# Patient Record
Sex: FEMALE | Race: OTHER | Employment: PART TIME
[De-identification: names, ages, dates, MRNs, and addresses within clinical notes are randomized per-mention and may not be internally consistent; named-entity substitution may affect disease eponyms.]

---
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
## 2021-11-15 DIAGNOSIS — N92.6 MISSED PERIOD: Primary | ICD-10-CM

## 2021-11-17 ENCOUNTER — HOSPITAL ENCOUNTER (OUTPATIENT)
Age: 21
Discharge: HOME | End: 2021-11-17
Payer: COMMERCIAL

## 2021-11-17 ENCOUNTER — TELEPHONE (OUTPATIENT)
Dept: OBGYN CLINIC | Age: 21
End: 2021-11-17

## 2021-11-17 DIAGNOSIS — N92.6 MISSED PERIOD: ICD-10-CM

## 2021-11-17 LAB — HCG QUANTITATIVE: ABNORMAL IU/L

## 2021-11-17 PROCEDURE — 84702 CHORIONIC GONADOTROPIN TEST: CPT

## 2021-11-17 PROCEDURE — 36415 COLL VENOUS BLD VENIPUNCTURE: CPT

## 2021-11-17 RX ORDER — .ALPHA.-TOCOPHEROL ACETATE, DL-, ASCORBIC ACID, CHOLECALCIFEROL, CYANOCOBALAMIN, FOLIC ACID, FERROUS FUMARATE, CALCIUM PHOSPHATE, DIBASIC, ANHYDROUS, NIACINAMIDE, PYRIDOXINE HYDROCHLORIDE, RIBOFLAVIN, THIAMINE MONONITRATE, AND VITAMIN A ACETATE 15; 60; 400; 4.5; 1; 27; 50; 13.5; 1.05; 1.2; 1.05; 25 [IU]/1; MG/1; [IU]/1; UG/1; MG/1; MG/1; MG/1; MG/1; MG/1; MG/1; MG/1; [IU]/1
1 TABLET ORAL DAILY
Qty: 30 TABLET | Refills: 12 | Status: SHIPPED
Start: 2021-11-17 | End: 2021-12-27 | Stop reason: ALTCHOICE

## 2021-11-17 NOTE — TELEPHONE ENCOUNTER
Per Autumn Raines NP, pt notified of +quant, LMP 10/1/21. Sent to Zenaida Sandoval to schedule US and OB intake.

## 2021-11-17 NOTE — TELEPHONE ENCOUNTER
----- Message from LIV Villasenor NP sent at 11/17/2021  9:16 AM EST -----  + quant  Determine LMP  Order PNVs  Patient will need scheduled for NOB

## 2021-12-01 DIAGNOSIS — Z34.90 EARLY STAGE OF PREGNANCY: Primary | ICD-10-CM

## 2021-12-13 ENCOUNTER — NURSE ONLY (OUTPATIENT)
Dept: OBGYN CLINIC | Age: 21
End: 2021-12-13

## 2021-12-13 ENCOUNTER — PROCEDURE VISIT (OUTPATIENT)
Dept: OBGYN CLINIC | Age: 21
End: 2021-12-13
Payer: COMMERCIAL

## 2021-12-13 VITALS
WEIGHT: 146 LBS | HEART RATE: 77 BPM | SYSTOLIC BLOOD PRESSURE: 122 MMHG | DIASTOLIC BLOOD PRESSURE: 68 MMHG | BODY MASS INDEX: 23.57 KG/M2

## 2021-12-13 DIAGNOSIS — Z3A.11 11 WEEKS GESTATION OF PREGNANCY: ICD-10-CM

## 2021-12-13 DIAGNOSIS — O09.91 SUPERVISION OF HIGH RISK PREGNANCY IN FIRST TRIMESTER: Primary | ICD-10-CM

## 2021-12-13 DIAGNOSIS — Z34.90 EARLY STAGE OF PREGNANCY: Primary | ICD-10-CM

## 2021-12-13 DIAGNOSIS — O36.80X0 PREGNANCY WITH INCONCLUSIVE FETAL VIABILITY, SINGLE OR UNSPECIFIED FETUS: Primary | ICD-10-CM

## 2021-12-13 PROBLEM — S63.621A SPRAIN OF INTERPHALANGEAL JOINT OF RIGHT THUMB: Status: RESOLVED | Noted: 2021-12-04 | Resolved: 2021-12-13

## 2021-12-13 PROBLEM — R55 VASOVAGAL NEAR SYNCOPE: Status: RESOLVED | Noted: 2017-03-28 | Resolved: 2021-12-13

## 2021-12-13 PROBLEM — S63.621A SPRAIN OF INTERPHALANGEAL JOINT OF RIGHT THUMB: Status: ACTIVE | Noted: 2021-12-04

## 2021-12-13 LAB
CRL: NORMAL
SAC DIAMETER: NORMAL

## 2021-12-13 PROCEDURE — 76801 OB US < 14 WKS SINGLE FETUS: CPT | Performed by: OBSTETRICS & GYNECOLOGY

## 2021-12-13 PROCEDURE — 0501F PRENATAL FLOW SHEET: CPT | Performed by: NURSE PRACTITIONER

## 2021-12-13 NOTE — PROGRESS NOTES
Patient presents to office for OB intake, nurse visit only. Intake completed following ultrasound in office to confirm pregnancy dating/viability. Based on ultrasound, patient is currently 11w0d  gestation, Estimated Date of Delivery: 7/4/22. Patient presents to intake Support person. This was an planned pregnancy. Patient currently taking has a current medication list which includes the following prescription(s): prenatal gummies, pnv folic acid + iron, ondansetron, and lac-dose. . Patient's medical, surgical, obstetrical and family history reviewed. See HER for details. Patient prenatal lab work given to patient at this visit as well as OB education material. New OB consent forms signed. Patient accepted first trimester screening. Cystic Fibrosis screening ordered. Referral placed to [facility] for first trimester screen. Patient with small subchorionic hemorrhage; instructed on pelvic rest and no heavy lifting. Patient scheduled for follow up OB appointment with Sourav Pablo NP on 1/5/2022. Patient instructed to complete lab work prior to follow up OB appointment.

## 2021-12-27 ENCOUNTER — ROUTINE PRENATAL (OUTPATIENT)
Dept: PERINATAL CARE | Age: 21
End: 2021-12-27
Payer: COMMERCIAL

## 2021-12-27 VITALS
DIASTOLIC BLOOD PRESSURE: 76 MMHG | HEART RATE: 94 BPM | BODY MASS INDEX: 23.46 KG/M2 | HEIGHT: 66 IN | WEIGHT: 146 LBS | SYSTOLIC BLOOD PRESSURE: 122 MMHG

## 2021-12-27 DIAGNOSIS — Z36.9 FIRST TRIMESTER SCREENING: Primary | ICD-10-CM

## 2021-12-27 LAB
CRL: NORMAL
SAC DIAMETER: NORMAL

## 2021-12-27 PROCEDURE — 76813 OB US NUCHAL MEAS 1 GEST: CPT | Performed by: OBSTETRICS & GYNECOLOGY

## 2021-12-27 PROCEDURE — 76801 OB US < 14 WKS SINGLE FETUS: CPT | Performed by: OBSTETRICS & GYNECOLOGY

## 2021-12-27 PROCEDURE — 99999 PR OFFICE/OUTPT VISIT,PROCEDURE ONLY: CPT | Performed by: OBSTETRICS & GYNECOLOGY

## 2022-01-05 ENCOUNTER — ROUTINE PRENATAL (OUTPATIENT)
Dept: OBGYN CLINIC | Age: 22
End: 2022-01-05

## 2022-01-05 ENCOUNTER — HOSPITAL ENCOUNTER (OUTPATIENT)
Age: 22
Discharge: HOME | End: 2022-01-05
Payer: COMMERCIAL

## 2022-01-05 VITALS
BODY MASS INDEX: 23.57 KG/M2 | WEIGHT: 146 LBS | DIASTOLIC BLOOD PRESSURE: 64 MMHG | HEART RATE: 82 BPM | SYSTOLIC BLOOD PRESSURE: 112 MMHG

## 2022-01-05 DIAGNOSIS — Z3A.11 11 WEEKS GESTATION OF PREGNANCY: ICD-10-CM

## 2022-01-05 DIAGNOSIS — Z3A.14 14 WEEKS GESTATION OF PREGNANCY: ICD-10-CM

## 2022-01-05 DIAGNOSIS — Z34.00 PRIMIGRAVIDA, ANTEPARTUM: Primary | ICD-10-CM

## 2022-01-05 DIAGNOSIS — Z34.90 EARLY STAGE OF PREGNANCY: ICD-10-CM

## 2022-01-05 LAB
ABO/RH: NORMAL
ABSOLUTE EOS #: 0 K/UL (ref 0–0.4)
ABSOLUTE IMMATURE GRANULOCYTE: ABNORMAL K/UL (ref 0–0.3)
ABSOLUTE LYMPH #: 1.7 K/UL (ref 1–4.8)
ABSOLUTE MONO #: 0.4 K/UL (ref 0.1–1.3)
ANTIBODY SCREEN: NEGATIVE
BASOPHILS # BLD: 0 % (ref 0–2)
BASOPHILS ABSOLUTE: 0 K/UL (ref 0–0.2)
BILIRUBIN URINE: NEGATIVE
COLOR: YELLOW
COMMENT UA: NORMAL
DIFFERENTIAL TYPE: ABNORMAL
EOSINOPHILS RELATIVE PERCENT: 0 % (ref 0–4)
GLUCOSE URINE: NEGATIVE
HCT VFR BLD CALC: 38 % (ref 36–46)
HEMOGLOBIN: 13 G/DL (ref 12–16)
IMMATURE GRANULOCYTES: ABNORMAL %
KETONES, URINE: NEGATIVE
LEUKOCYTE ESTERASE, URINE: NEGATIVE
LYMPHOCYTES # BLD: 21 % (ref 25–45)
MCH RBC QN AUTO: 30.7 PG (ref 26–34)
MCHC RBC AUTO-ENTMCNC: 34.1 G/DL (ref 31–37)
MCV RBC AUTO: 90.1 FL (ref 80–100)
MONOCYTES # BLD: 5 % (ref 2–8)
NITRITE, URINE: NEGATIVE
NRBC AUTOMATED: ABNORMAL PER 100 WBC
PDW BLD-RTO: 12.6 % (ref 11.5–14.9)
PH UA: 6.5 (ref 5–8)
PLATELET # BLD: 261 K/UL (ref 150–450)
PLATELET ESTIMATE: ABNORMAL
PMV BLD AUTO: 7.9 FL (ref 6–12)
PROTEIN UA: NEGATIVE
RBC # BLD: 4.22 M/UL (ref 4–5.2)
RBC # BLD: ABNORMAL 10*6/UL
SEG NEUTROPHILS: 74 % (ref 34–64)
SEGMENTED NEUTROPHILS ABSOLUTE COUNT: 5.9 K/UL (ref 1.3–9.1)
SPECIFIC GRAVITY UA: 1.02 (ref 1–1.03)
TSH SERPL DL<=0.05 MIU/L-ACNC: 1.19 MIU/L (ref 0.3–5)
TURBIDITY: CLEAR
URINE HGB: NEGATIVE
UROBILINOGEN, URINE: NORMAL
WBC # BLD: 8 K/UL (ref 4.5–13.5)
WBC # BLD: ABNORMAL 10*3/UL

## 2022-01-05 PROCEDURE — 86778 TOXOPLASMA ANTIBODY IGM: CPT

## 2022-01-05 PROCEDURE — 86780 TREPONEMA PALLIDUM: CPT

## 2022-01-05 PROCEDURE — 0502F SUBSEQUENT PRENATAL CARE: CPT | Performed by: NURSE PRACTITIONER

## 2022-01-05 PROCEDURE — 86777 TOXOPLASMA ANTIBODY: CPT

## 2022-01-05 PROCEDURE — 85025 COMPLETE CBC W/AUTO DIFF WBC: CPT

## 2022-01-05 PROCEDURE — 86762 RUBELLA ANTIBODY: CPT

## 2022-01-05 PROCEDURE — 81003 URINALYSIS AUTO W/O SCOPE: CPT

## 2022-01-05 PROCEDURE — 84443 ASSAY THYROID STIM HORMONE: CPT

## 2022-01-05 PROCEDURE — 86850 RBC ANTIBODY SCREEN: CPT

## 2022-01-05 PROCEDURE — 86901 BLOOD TYPING SEROLOGIC RH(D): CPT

## 2022-01-05 PROCEDURE — 81220 CFTR GENE COM VARIANTS: CPT

## 2022-01-05 PROCEDURE — 86900 BLOOD TYPING SEROLOGIC ABO: CPT

## 2022-01-05 PROCEDURE — 87389 HIV-1 AG W/HIV-1&-2 AB AG IA: CPT

## 2022-01-05 PROCEDURE — 36415 COLL VENOUS BLD VENIPUNCTURE: CPT

## 2022-01-05 PROCEDURE — 87340 HEPATITIS B SURFACE AG IA: CPT

## 2022-01-05 NOTE — PROGRESS NOTES
Katia Seen  2022    YOB: 2000   No LMP recorded (approximate). Patient is pregnant. 14w2d        Primary Care Physician: No primary care provider on file. CC: Initial Prenatal Visit    Subjective:     Katia Seen is a 24 y.o. female     is being seen today for her first obstetrical visit. This is not a planned pregnancy. She is at 14w2d  Her obstetrical history is significant for primigravida      Relationship with FOB: significant other, living together. Patient does not intend to breast feed. Pregnancy history fully reviewed. Mother's ethnicity: mixed  Father's ethnicity: mixed       Objective:   Blood pressure 112/64, pulse 82, weight 146 lb (66.2 kg), not currently breastfeeding. OB History    Para Term  AB Living   1 0 0 0 0 0   SAB IAB Ectopic Molar Multiple Live Births   0 0 0 0 0 0      # Outcome Date GA Lbr Daniel/2nd Weight Sex Delivery Anes PTL Lv   1 Current                Past Medical History:   Diagnosis Date    Anemia     Atopic dermatitis 10/23/2011    Depression     anxiety    Multiple fractures 10/23/2011    Otitis media, left 1/10/2012    Sports physical 10/17/2016    Sprain of interphalangeal joint of right thumb 2021    Vasovagal near syncope 3/28/2017     History reviewed. No pertinent surgical history.    Social History     Socioeconomic History    Marital status: Single     Spouse name: Not on file    Number of children: Not on file    Years of education: Not on file    Highest education level: Not on file   Occupational History    Not on file   Tobacco Use    Smoking status: Passive Smoke Exposure - Never Smoker    Smokeless tobacco: Never Used    Tobacco comment: dad smokes outside   Vaping Use    Vaping Use: Never used   Substance and Sexual Activity    Alcohol use: Not Currently     Alcohol/week: 0.0 standard drinks    Drug use: Not Currently    Sexual activity: Never   Other Topics Concern    Not on file   Social History Narrative    Not on file     Social Determinants of Health     Financial Resource Strain:     Difficulty of Paying Living Expenses: Not on file   Food Insecurity:     Worried About Running Out of Food in the Last Year: Not on file    Heron of Food in the Last Year: Not on file   Transportation Needs:     Lack of Transportation (Medical): Not on file    Lack of Transportation (Non-Medical):  Not on file   Physical Activity:     Days of Exercise per Week: Not on file    Minutes of Exercise per Session: Not on file   Stress:     Feeling of Stress : Not on file   Social Connections:     Frequency of Communication with Friends and Family: Not on file    Frequency of Social Gatherings with Friends and Family: Not on file    Attends Jain Services: Not on file    Active Member of [address] [place] [address] [employer] or Organizations: Not on file    Attends Club or Organization Meetings: Not on file    Marital Status: Not on file   Intimate Partner Violence:     Fear of Current or Ex-Partner: Not on file    Emotionally Abused: Not on file    Physically Abused: Not on file    Sexually Abused: Not on file   Housing Stability:     Unable to Pay for Housing in the Last Year: Not on file    Number of [place] in the Last Year: Not on file    Unstable Housing in the Last Year: Not on file     Family History   Problem Relation Age of Onset    Rheum Arthritis Mother         psoriatic arthritis    Hypertension Mother     Diabetes Mother     Heart Disease Mother         by pass    Irritable Bowel Syndrome Mother     Hypertension Father     Asthma Sister     Arthritis Maternal Grandmother     Asthma Maternal Grandmother     Heart Disease Maternal Grandfather     Diabetes Maternal Grandfather     Stroke Paternal Grandmother     Cancer Paternal Grandfather         prostate    Heart Disease Paternal Grandfather         arrythmia    Diabetes Paternal Grandfather     Bipolar Disorder Maternal Aunt        MEDICATIONS:  Current Outpatient Medications   Medication Sig Dispense Refill    Prenatal MV & Min w/FA-DHA (PRENATAL GUMMIES) 0.18-25 MG CHEW Take 1 tablet by mouth Daily 30 tablet 11     No current facility-administered medications for this visit. ALLERGIES:  Allergies as of 2022    (No Known Allergies)           Physical Exam Completed-See Epic Navigator    Chaperone for Intimate Exam   Chaperone was offered and accepted as part of the rooming process.  Chaperone: Sugar BAHENA               Assessment:      Pregnancy at 15 and 2/7 weeks    Diagnosis Orders   1. 14 weeks gestation of pregnancy             There are no problems to display for this patient. Plan:      Initial labs drawn. Pap smear collected  Vaginal cultures collected   Prenatal vitamins. Problem list reviewed and updated. NT Screen/Quad Testing and MSAFP Single Marker: requested  Role of ultrasound in pregnancy discussed; fetal survey: requests  Amniocentesis discussed: Not indicated  NIPT Testing not indicated  Cultures & Wet Prep Collected  Follow-up in 4 weeks  Repeat Annual every 1 year  Cervical Cytology Evaluation begins at 24years old. If Negative Cytology, Follow-up screening per current guidelines. MFM appointment scheduled      COUNSELING COMPLETED:  INITIAL OBSTETRICAL VISIT EVALUATION:  The patient was seen full history and physical was completed/reviewed. Cytology was collected for patients over 24years of age. Cultures were collected. The patient was counseled on office policies and she was counseled on termination of pregnancy in the state of [place]. The patient was counseled on Toxoplasmosis, HIV, Tobacco Abuse, Group Beta Strep Infections, Cystic Fibrosis,  Labor precautions and Sickle Cell disease. The patient was counseled on the risks of tobacco abuse.  Both maternal and fetal. She was instructed to stop smoking if currently using tobacco. Morbidity, mortality, and cessation programs were reviewed. The risks include but are not limited to increased risks of  labor,  delivery, premature rupture of membranes, intrauterine growth restriction, intrauterine fetal demise and abruptio placenta. Secondary smoke risks were also reviewed. Increases in cancer, respiratory problems, and sudden infant death syndrome were reviewed as well. The patient was informed of a 2-4% risk of congenital anomalies in the general population. She was also informed that karyotyping is the only way to evaluate the fetus for genetic problems and genetic lethal anomalies. Chorionic villous sampling, amniocentesis and NIPT testing were also discussed with morbidity rates in detail. She requested the procedure. Route of delivery and counseling on vaginal, operative vaginal, and  sections were completed with the risks of each to both the patient as well as her baby. The possibility of a blood transfusion was discussed as well. The patient was not opposed to receiving a transfusion if needed. Nuchal translucency/Quad Evaluation and MSAFP single marker testing was reviewed in detail with attention to timing of testing and their windows. For patients beyond the gestational age for Nuchal translucency evaluation Quad testing was recommended. Timing for the Quad test was reviewed. Benefits of the above testing was reviewed. A second trimester amniocentesis was also made available to the patient. Risks, Benefits and non-invasive alternative testing was reviewed. The literature regarding a questionable link to pitocin augmentation and induction of labor, the assistance of labor contractions and the initiation of contractions to help delivery, have been reviewed with the patient regarding the increased potential of having a  with Attention Deficit Hyperactivity Disorder and or Autism.  These two disorders and the ramifications of their impact on a child and the family caring for that child has been reviewed with the patient in detail. She was given the risks, benefits and alternatives of the use of this medication. She has agreed to its use in the delivery of her unborn child if needed at the time of delivery, Yes. The patient was questioned in detail regarding any genetic misnomer history, chromosomal abnormalities, or learning disabilities in  herself, the father of the baby or their families. SHE DENIED ANY HISTORY AS STATED ABOVE: Yes    Upon completion of the visit all questions were answered and the patients follow-up and testing schedule were reviewed. Prenatal vitamins were given. T-dap Vaccine recommendations reviewed with the patient. Patient notified of timing of vaccination 27-36 weeks gestation. Patient aware Vaccine is NOT Live. Yes. The patient, Sven Fowler is a 24 y.o. female, was seen with a total time spent of 30 minutes for the visit on this date of service by the E/M provider. The time component had both face to face and non face to face time spent in determining the total time component. Counseling and education regarding her diagnosis listed below and her options regarding those diagnoses were also included in determining her time component. Diagnosis Orders   1. 14 weeks gestation of pregnancy          The patient had her preventative health maintenance recommendations and follow-up reviewed with her at the completion of her visit.

## 2022-01-06 ENCOUNTER — HOSPITAL ENCOUNTER (OUTPATIENT)
Age: 22
Discharge: HOME | End: 2022-01-06
Payer: COMMERCIAL

## 2022-01-06 DIAGNOSIS — Z3A.11 11 WEEKS GESTATION OF PREGNANCY: ICD-10-CM

## 2022-01-06 DIAGNOSIS — Z34.90 EARLY STAGE OF PREGNANCY: ICD-10-CM

## 2022-01-06 LAB
GLUCOSE ADMINISTRATION: NORMAL
GLUCOSE TOLERANCE SCREEN 50G: 95 MG/DL (ref 70–135)
HEPATITIS B SURFACE ANTIGEN: NONREACTIVE
HIV AG/AB: NONREACTIVE
RUBV IGG SER QL: 299.2 IU/ML
T. PALLIDUM, IGG: NONREACTIVE
TOXOPLASM IGM: 0.44 INDEX
TOXOPLASMA BLOOD FOR RATIO: <0.5 IU/ML

## 2022-01-06 PROCEDURE — 82950 GLUCOSE TEST: CPT

## 2022-01-06 PROCEDURE — 36415 COLL VENOUS BLD VENIPUNCTURE: CPT

## 2022-01-10 DIAGNOSIS — Z3A.14 14 WEEKS GESTATION OF PREGNANCY: ICD-10-CM

## 2022-01-12 LAB — CYSTIC FIBROSIS: NORMAL

## 2022-01-13 ENCOUNTER — TELEPHONE (OUTPATIENT)
Dept: OBGYN CLINIC | Age: 22
End: 2022-01-13

## 2022-01-13 DIAGNOSIS — Z3A.14 14 WEEKS GESTATION OF PREGNANCY: ICD-10-CM

## 2022-01-13 RX ORDER — METRONIDAZOLE 500 MG/1
500 TABLET ORAL 2 TIMES DAILY
Qty: 14 TABLET | Refills: 0 | Status: SHIPPED | OUTPATIENT
Start: 2022-01-13 | End: 2022-01-20

## 2022-01-13 NOTE — TELEPHONE ENCOUNTER
tried calling pt regarding +BV on culture and pt will need flagyl 500mg. No answer and mailbox if full. I will try later.

## 2022-02-09 ENCOUNTER — ROUTINE PRENATAL (OUTPATIENT)
Dept: OBGYN CLINIC | Age: 22
End: 2022-02-09

## 2022-02-09 VITALS
HEART RATE: 84 BPM | SYSTOLIC BLOOD PRESSURE: 122 MMHG | BODY MASS INDEX: 25.99 KG/M2 | WEIGHT: 161 LBS | DIASTOLIC BLOOD PRESSURE: 82 MMHG

## 2022-02-09 DIAGNOSIS — Z3A.19 19 WEEKS GESTATION OF PREGNANCY: Primary | ICD-10-CM

## 2022-02-09 PROCEDURE — 0502F SUBSEQUENT PRENATAL CARE: CPT | Performed by: NURSE PRACTITIONER

## 2022-02-09 NOTE — PROGRESS NOTES
Bette Doe is a 24 y.o. female 25w3d        OB History    Para Term  AB Living   1             SAB IAB Ectopic Molar Multiple Live Births                    # Outcome Date GA Lbr Daniel/2nd Weight Sex Delivery Anes PTL Lv   1 Current                Vitals  BP: 122/82  Weight: 161 lb (73 kg)  Pulse: 84  Patient Position: Sitting  Albumin: Negative  Glucose: Negative    The patient was seen and evaluated. There was positive fetal movements. No contractions or leakage of fluid. Signs and symptoms of  labor as well as labor were reviewed. The Nuchal Translucency testing was reviewed and found to be normal A single marker MSAFP was reviewed and found to be ordered today. The patients anatomy ultrasound has been completed and reviewed with patient. TOP ST OH Reviewed. A 28 week lab panel was ordered. This includes a (HH, 1 hr GTT, U/A C&S). The patient is to complete this in the next two to four weeks. The S/S of Pre-Eclampsia were reviewed with the patient in detail. She is to report any of these if they occur. She currently denies any of these. The patient is RH positive Rhogam Ordered no    The patient was instructed on fetal kick counts and was given a kick sheet to complete every 8 hours. This is to begin at 28 weeks gestation. She was instructed that the baby should move at a minimum of ten times within one hour after a meal. The patient was instructed to lay down on her left side twenty minutes after eating and count movements for up to one hour with a target value of ten movements. She was instructed to notify the office if she did not make that target after two attempts or if after any attempt there was less than four movements. No Patient Care Coordination Note on file. Assessment:  1. Bette Doe is a 24 y.o. female  2.    3. 19w2d    Patient Active Problem List    Diagnosis Date Noted   Scarlet Plum, antepartum 2022     Priority: High Diagnosis Orders   1. 19 weeks gestation of pregnancy  Alpha Fetoprotein, Maternal         Plan:  The patient will return to the office for her next visit in 4 weeks. See antepartum flow sheet. Lab slip given for JONO.  KARON 2/14/2022        Patient was seen with total face to face time of 20 minutes. More than 50% of this visit was on counseling and education regarding her    Diagnosis Orders   1. 19 weeks gestation of pregnancy  Alpha Fetoprotein, Maternal    and her options. She was also counseled on her preventative health maintenance recommendations and follow-up.

## 2022-02-14 ENCOUNTER — ROUTINE PRENATAL (OUTPATIENT)
Dept: PERINATAL CARE | Age: 22
End: 2022-02-14
Payer: COMMERCIAL

## 2022-02-14 VITALS
BODY MASS INDEX: 26.68 KG/M2 | WEIGHT: 166 LBS | HEART RATE: 94 BPM | TEMPERATURE: 96.8 F | RESPIRATION RATE: 16 BRPM | DIASTOLIC BLOOD PRESSURE: 64 MMHG | SYSTOLIC BLOOD PRESSURE: 119 MMHG | HEIGHT: 66 IN

## 2022-02-14 DIAGNOSIS — Z36.86 ENCOUNTER FOR SCREENING FOR RISK OF PRE-TERM LABOR: ICD-10-CM

## 2022-02-14 DIAGNOSIS — O43.112 CIRCUMVALLATE PLACENTA IN SECOND TRIMESTER: Primary | ICD-10-CM

## 2022-02-14 DIAGNOSIS — Z3A.20 20 WEEKS GESTATION OF PREGNANCY: ICD-10-CM

## 2022-02-14 DIAGNOSIS — O44.40 LOW IMPLANTATION OF PLACENTA WITHOUT HEMORRHAGE: ICD-10-CM

## 2022-02-14 PROBLEM — O43.119 ANTEPARTUM PLACENTA CIRCUMVALLATA: Status: ACTIVE | Noted: 2022-02-14

## 2022-02-14 LAB
ABDOMINAL CIRCUMFERENCE: NORMAL
ABDOMINAL CIRCUMFERENCE: NORMAL
BIPARIETAL DIAMETER: NORMAL
BIPARIETAL DIAMETER: NORMAL
ESTIMATED FETAL WEIGHT: NORMAL
ESTIMATED FETAL WEIGHT: NORMAL
FEMORAL DIAMETER: NORMAL
FEMORAL DIAMETER: NORMAL
HC/AC: NORMAL
HC/AC: NORMAL
HEAD CIRCUMFERENCE: NORMAL
HEAD CIRCUMFERENCE: NORMAL

## 2022-02-14 PROCEDURE — 76817 TRANSVAGINAL US OBSTETRIC: CPT | Performed by: OBSTETRICS & GYNECOLOGY

## 2022-02-14 PROCEDURE — 76811 OB US DETAILED SNGL FETUS: CPT | Performed by: OBSTETRICS & GYNECOLOGY

## 2022-02-14 PROCEDURE — 99999 PR OFFICE/OUTPT VISIT,PROCEDURE ONLY: CPT | Performed by: OBSTETRICS & GYNECOLOGY

## 2022-02-14 NOTE — PROGRESS NOTES
Advise repeat 1-hour glucola between 24-28 weeks' gestation to evaluate for gestational diabetes. Advise MSAFP (maternal serum alpha-feto protein level) only lab blood draw (ordered by referring OB provider previously). Patient declines Maternal-Fetal Medicine consultation at this time regarding the medical/obstetrical complications of pregnancy. Maternal-Fetal Medicine (MFM) attending physician will defer all management for the medical/obstetrical complications of pregnancy to the primary attending obstetrical physician, as a result. Therefore, only an ultrasound evaluation was completed today in the MFM office.

## 2022-02-18 ENCOUNTER — HOSPITAL ENCOUNTER (OUTPATIENT)
Age: 22
Discharge: HOME | End: 2022-02-18
Payer: COMMERCIAL

## 2022-02-18 PROCEDURE — 86336 INHIBIN A: CPT

## 2022-02-18 PROCEDURE — 84702 CHORIONIC GONADOTROPIN TEST: CPT

## 2022-02-18 PROCEDURE — 82677 ASSAY OF ESTRIOL: CPT

## 2022-02-18 PROCEDURE — 36415 COLL VENOUS BLD VENIPUNCTURE: CPT

## 2022-02-18 PROCEDURE — 82105 ALPHA-FETOPROTEIN SERUM: CPT

## 2022-02-21 LAB
AFP INTERPRETATION: NORMAL
AFP MOM: 0.84
AFP QUAD INTERPRETATION: NORMAL
AFP SPECIMEN: NORMAL
AFP: 52 NG/ML
DATE OF BIRTH: NORMAL
DATING METHOD: NORMAL
DETERMINED BY: NORMAL
DIABETIC: NO
DIMERIC INHIBIN A: 132 PG/ML
DONOR EGG?: NO
DUE DATE: NORMAL
ESTIMATED DUE DATE: NORMAL
FAMILY HISTORY NTD: NO
GESTATIONAL AGE: NORMAL
HISTORY OF ANEUPLOIDY?: NO
IN VITRO FERTILIZATION: NO
INHIBIN A MOM: 0.81
INSULIN REQ DIABETES: NO
LAST MENSTRUAL PERIOD: NORMAL
MATERNAL AGE AT EDD: 22 YR
MATERNAL WEIGHT: 161
MOM FOR HCG, 2ND TRIMESTER: 0.59
MONOCHORIONIC TWINS: NO
NUMBER OF FETUSES: NORMAL
PATIENT WEIGHT UNITS: NORMAL
PATIENT WEIGHT: NORMAL
PATIENT'S HCG, TRI 2: NORMAL IU/L
PREV TRISOMY PREG: NO
RACE (MATERNAL): NORMAL
RACE: NORMAL
REPEAT SPECIMEN?: NO
SMOKING: NO
SMOKING: NO
UE3 MOM: 0.89
UE3 VALUE: 2.19 NG/ML
VALPROIC/CARBAMAZEP: NO
ZZ NTE CLEAN UP: HISTORY: NO

## 2022-03-15 ENCOUNTER — ROUTINE PRENATAL (OUTPATIENT)
Dept: OBGYN CLINIC | Age: 22
End: 2022-03-15

## 2022-03-15 VITALS
WEIGHT: 180 LBS | DIASTOLIC BLOOD PRESSURE: 62 MMHG | BODY MASS INDEX: 29.05 KG/M2 | HEART RATE: 97 BPM | SYSTOLIC BLOOD PRESSURE: 120 MMHG

## 2022-03-15 DIAGNOSIS — O44.40 LOW-LYING PLACENTA: ICD-10-CM

## 2022-03-15 DIAGNOSIS — Z3A.24 24 WEEKS GESTATION OF PREGNANCY: Primary | ICD-10-CM

## 2022-03-15 PROCEDURE — 0502F SUBSEQUENT PRENATAL CARE: CPT | Performed by: OBSTETRICS & GYNECOLOGY

## 2022-03-15 NOTE — PROGRESS NOTES
Suzanne Sensor is a 24 y.o. female 25w3d        OB History    Para Term  AB Living   1             SAB IAB Ectopic Molar Multiple Live Births                    # Outcome Date GA Lbr Daniel/2nd Weight Sex Delivery Anes PTL Lv   1 Current                Vitals  BP: 120/62  Weight: 180 lb (81.6 kg)  Pulse: 97  Patient Position: Sitting  Albumin: Negative  Glucose: Negative    The patient was seen and evaluated. There was positive fetal movements. No contractions or leakage of fluid. Signs and symptoms of  labor as well as labor were reviewed. The Nuchal Translucency testing was reviewed and found to be normal A single marker MSAFP was reviewed and found to be normal. The patients anatomy ultrasound has been completed and reviewed with patient. TOP ST OH Reviewed. A 28 week lab panel was ordered. This includes a (HH, 1 hr GTT, U/A C&S). The patient is to complete this in the next two to four weeks. The S/S of Pre-Eclampsia were reviewed with the patient in detail. She is to report any of these if they occur. She currently denies any of these. The patient is RH positive Rhogam Ordered no    The patient was instructed on fetal kick counts and was given a kick sheet to complete every 8 hours. This is to begin at 28 weeks gestation. She was instructed that the baby should move at a minimum of ten times within one hour after a meal. The patient was instructed to lay down on her left side twenty minutes after eating and count movements for up to one hour with a target value of ten movements. She was instructed to notify the office if she did not make that target after two attempts or if after any attempt there was less than four movements. No Patient Care Coordination Note on file. Assessment:  1. Suzanne Sensor is a 24 y.o. female  2.    3. 24w1d    Patient Active Problem List    Diagnosis Date Noted    Low-lying placenta 2022     Priority: High     Pelvic rest/ no heavy lifting       Primigravida, antepartum 2022     Priority: High    Antepartum placenta circumvallata 2022        Diagnosis Orders   1. 24 weeks gestation of pregnancy     2. Low-lying placenta           Plan:  The patient will return to the office for her next visit in 4 weeks. See antepartum flow sheet. [id number] [address] [facility]  [facility] [address]  [phone]    Yolanda Milligan  3/15/2022  No LMP recorded (approximate). Patient is pregnant. INITIAL OBSTETRICAL VISIT EVALUATION:  The patient was seen full history and physical was completed/reviewed. Cytology was collected for patients over 24years of age. Cultures were collected. The patient was counseled on office policies and she was counseled on termination of pregnancy in the state of [place]. The patient was counseled on Toxoplasmosis, HIV, Tobacco Abuse, Group Beta Strep Infections, Cystic Fibrosis,  Labor precautions and Sickle Cell disease. The patient was counseled on the risks of tobacco abuse. Both maternal and fetal. She was instructed to stop smoking if currently using tobacco. Morbidity, mortality, and cessation programs were reviewed. The risks include but are not limited to increased risks of  labor,  delivery, premature rupture of membranes, intrauterine growth restriction, intrauterine fetal demise and abruptio placenta. Secondary smoke risks were also reviewed. Increases in cancer, respiratory problems, and sudden infant death syndrome were reviewed as well. The patient was informed of a 2-4% risk of congenital anomalies in the general population. She was also informed that karyotyping is the only way to evaluate the fetus for genetic problems and genetic lethal anomalies. Chorionic villous sampling, amniocentesis and Maternal Genetic Blood Sampling-(NIPT Testing) were also discussed with morbidity rates in detail.  She declined any of the options. Route of delivery and counseling on vaginal, operative vaginal, and  sections were completed with the risks of each to both the patient as well as her baby. The possibility of a blood transfusion was discussed as well. The patient was not opposed to receiving a transfusion if needed. Nuchal translucency and MSAFP single marker testing was reviewed in detail with attention to timing of testing and their windows. For patients beyond the gestational age for Nuchal translucency evaluation, ([id number]) Quad testing was recommended. Timing for the Quad test was reviewed. Benefits of the above testing was reviewed. A second trimester amniocentesis was also made available to the patient. Risks, Benefits and non-invasive alternative testing was reviewed. The patients diagnosed with Advanced maternal age, AMA (age of 27 yo or beyond at delivery), had NIPT recommended. This was discussed in lay terms with Risks and Benefits reviewed. The literature regarding a questionable link to pitocin augmentation and induction of labor, the assistance of labor contractions and the initiation of contractions to help delivery, have been reviewed with the patient regarding the increased potential of having a  with Attention Deficit Hyperactivity Disorder and or Autism. These two disorders and the ramifications of their impact on a child and the family caring for that child has been reviewed with the patient in detail. She was given the risks, benefits and alternatives of the use of this medication. She has agreed to its use in the delivery of her unborn child if needed at the time of delivery, Yes. The patient was questioned in detail regarding any genetic misnomer history, chromosomal abnormalities, or learning disabilities in  herself, the father of the baby or their families.  SHE DENIED ANY HISTORY AS STATED ABOVE: Yes    Upon completion of the visit all questions were answered and the patients follow-up and testing schedule were reviewed. Prenatal vitamins were given. Patient was seen with total face to face time of 20 minutes. More than 50% of this visit was on counseling and education regarding her    Diagnosis Orders   1. 24 weeks gestation of pregnancy     2. Low-lying placenta      and her options. She was also counseled on her preventative health maintenance recommendations and follow-up.

## 2022-03-28 ENCOUNTER — ROUTINE PRENATAL (OUTPATIENT)
Dept: PERINATAL CARE | Age: 22
End: 2022-03-28
Payer: COMMERCIAL

## 2022-03-28 VITALS
WEIGHT: 181 LBS | HEART RATE: 96 BPM | SYSTOLIC BLOOD PRESSURE: 119 MMHG | HEIGHT: 66 IN | DIASTOLIC BLOOD PRESSURE: 75 MMHG | BODY MASS INDEX: 29.09 KG/M2 | RESPIRATION RATE: 16 BRPM | TEMPERATURE: 96.9 F

## 2022-03-28 DIAGNOSIS — Z03.72 SUSPECTED PLACENTAL PROBLEM NOT FOUND: ICD-10-CM

## 2022-03-28 DIAGNOSIS — Z36.4 ANTENATAL SCREENING FOR FETAL GROWTH RETARDATION USING ULTRASONICS: ICD-10-CM

## 2022-03-28 DIAGNOSIS — Z3A.26 26 WEEKS GESTATION OF PREGNANCY: ICD-10-CM

## 2022-03-28 DIAGNOSIS — O44.40 LOW IMPLANTATION OF PLACENTA WITHOUT HEMORRHAGE: ICD-10-CM

## 2022-03-28 DIAGNOSIS — O43.112 CIRCUMVALLATE PLACENTA IN SECOND TRIMESTER: Primary | ICD-10-CM

## 2022-03-28 PROCEDURE — 76817 TRANSVAGINAL US OBSTETRIC: CPT | Performed by: OBSTETRICS & GYNECOLOGY

## 2022-03-28 PROCEDURE — 99999 PR OFFICE/OUTPT VISIT,PROCEDURE ONLY: CPT | Performed by: OBSTETRICS & GYNECOLOGY

## 2022-03-28 PROCEDURE — 76816 OB US FOLLOW-UP PER FETUS: CPT | Performed by: OBSTETRICS & GYNECOLOGY

## 2022-03-28 RX ORDER — AMOXICILLIN 500 MG/1
CAPSULE ORAL
COMMUNITY
Start: 2022-03-20 | End: 2022-04-27

## 2022-04-13 ENCOUNTER — ROUTINE PRENATAL (OUTPATIENT)
Dept: OBGYN CLINIC | Age: 22
End: 2022-04-13

## 2022-04-13 VITALS
SYSTOLIC BLOOD PRESSURE: 114 MMHG | DIASTOLIC BLOOD PRESSURE: 62 MMHG | BODY MASS INDEX: 31.15 KG/M2 | HEART RATE: 97 BPM | WEIGHT: 193 LBS

## 2022-04-13 DIAGNOSIS — O44.40 LOW-LYING PLACENTA: ICD-10-CM

## 2022-04-13 DIAGNOSIS — O43.119 ANTEPARTUM PLACENTA CIRCUMVALLATA: ICD-10-CM

## 2022-04-13 DIAGNOSIS — Z3A.28 28 WEEKS GESTATION OF PREGNANCY: ICD-10-CM

## 2022-04-13 DIAGNOSIS — Z34.00 PRIMIGRAVIDA, ANTEPARTUM: Primary | ICD-10-CM

## 2022-04-13 PROCEDURE — 0502F SUBSEQUENT PRENATAL CARE: CPT | Performed by: NURSE PRACTITIONER

## 2022-04-13 NOTE — PROGRESS NOTES
Nisreen Tejeda is a 24 y.o. female 34w4d        OB History    Para Term  AB Living   1             SAB IAB Ectopic Molar Multiple Live Births                    # Outcome Date GA Lbr Daniel/2nd Weight Sex Delivery Anes PTL Lv   1 Current                Vitals         The patient was seen and evaluated. There was positive fetal movements. No contractions or leakage of fluid. Signs and symptoms of  labor as well as labor were reviewed. The S/S of Pre-Eclampsia were reviewed with the patient in detail. She is to report any of these if they occur. She currently denies any of these. The patient had her 28 week labs ordered. No visits with results within 5 Week(s) from this visit.    Latest known visit with results is:   Hospital Outpatient Visit on 2022   Component Date Value Ref Range Status    Date of Birth 2022 6,262,000   Final    Maternal Weight 2022 161   Final    Patient Weight Units 2022 LBS   Final    Est Due Date 2022 [id number]   Final    Dating Method 2022 38,421,017   Final    Last Menstrual Period 2022 17,541,643   Final    Monochorionic Twins 2022 NO   Final    Race (Maternal) 2022    Final    Diabetic 2022 NO   Final    Smoking 2022 NO   Final    Valproic/Carbamazep 2022 NO   Final    Prev Trisomy Preg 2022 NO   Final    Fam HX NTD 2022 NO   Final    In Vitro Fertilization 2022 NO   Final    Donor Egg? 2022 NO   Final    Repeat Specimen? 2022 NO   Final    AFP (Alpha Fetoprotein) 2022 52  ng/mL Final    AFP Mom 2022 0.84   Final    uE3 Value 2022 2.19  ng/mL Final    uE3 MoM 2022 0.89   Final    Patient's HCG, Tri 2 2022 11,961  IU/L Final    Mom For HCG, 2nd Trimester 2022 0.59   Final    Dimeric Inhibin A 2022 132  pg/mL Final    Inhibin A MoM 2022 0.81   Final    AFP Interp 2022 Screen Neg   Final    Comment: (NOTE)  INTERPRETATION: SCREEN NEGATIVE                               Neural Tube Defects (NTD)   Negative       Down syndrome (DS)          Negative       Trisomy 18 (T18)            Negative                                                               Pre-Test     Post-Test    Cutoff                                      Neural Tube Defects Risks   1:1030       < 1:20959    1:250          Down Syndrome Risks         1:1120       < 1:04659    1:150          Trisomy 18 Risks            1:4360       < 1:31377    1:100                                       Comments: The risk of an open neural tube defect is less than the  screening cut-off. The risk of Down syndrome is less than the screening  cut-off. The risk of trisomy 25 is less than the screening cut-off. This test was developed and its performance characteristics   determined by [facility]. It has not been cleared or   approved by the [employer] and Drug Administration. This test was   performed in a CLIA certified laboratory and is intended for   clinical purposes.  Maternal Age At [facility] 02/18/2022 22.0  yr Final    Patient Weight 02/18/2022 161.0 lbs.    Final    Due Date 02/18/2022 SEE NOTE   Final    Comment: Results for Estimated Due Date: 07 04 22       Gestational Age 02/18/2022 20 wks, 4 days   Final    Determined by 02/18/2022 Other   Final    Number of Fetuses 02/18/2022 Titoscottthelma Estrada   Final    Race 02/18/2022 Unknown   Final    Insulin Req Diabetes 02/18/2022 No   Final    Smoking 02/18/2022 No   Final    History 02/18/2022 No   Final    History of Aneuploidy? 02/18/2022 No   Final    AFP Specimen 02/18/2022 See Note   Final    Initial sample    AFP Quad Interp 02/18/2022 See Note   Final    Comment: (NOTE)  Access [facility] Enhanced Report using the link below:    -Direct access: [email or web address]  Performed By: [facility] [address]  [place], [address] [facility]  : Ganga Anthony. Leesa Saint, MD     ]    No Patient Care Coordination Note on file. T-Dap Vaccine (27-36 weeks): awaiting    The patient was instructed on fetal kick counts and was given a kick sheet to complete every 8 hours. She was instructed that the baby should move at a minimum of ten times within one hour after a meal. The patient was instructed to lay down on her left side twenty minutes after eating and count movements for up to one hour with a target value of ten movements. She was instructed to notify the office if she did not make that target after two attempts or if after any attempt there was less than four movements. The patient reports that the targets have been made Yes.  Testing:  Not indicated    Assessment:  1Sparkle Teixeira is a 24 y.o. female  2.   3. 28w2d    Patient Active Problem List    Diagnosis Date Noted    Low-lying placenta 2022     Priority: High     Pelvic rest/ no heavy lifting       Primigravida, antepartum 2022     Priority: High    Antepartum placenta circumvallata 2022        Diagnosis Orders   1. Well woman exam with routine gynecological exam  Urinalysis with Reflex to Culture    CBC with Auto Differential    Glucose tolerance, 1 hour   2. Special screening examination for human papillomavirus (HPV)  Urinalysis with Reflex to Culture    CBC with Auto Differential    Glucose tolerance, 1 hour             Plan:  The patient will return to the office for her next visit in 2 weeks. See antepartum flow sheet. Reviewed s/sx of  labor and preeclampsia  Continue [facility]       Testing Indicated: No  Scheduled with Nursing-Pt notified: No      Patient was seen with total face to face time of 15 minutes.  More than 50% of this visit was on counseling and education

## 2022-04-18 ENCOUNTER — HOSPITAL ENCOUNTER (OUTPATIENT)
Age: 22
Discharge: HOME | End: 2022-04-18
Payer: COMMERCIAL

## 2022-04-18 LAB
ABSOLUTE EOS #: 0.1 K/UL (ref 0–0.4)
ABSOLUTE LYMPH #: 1.6 K/UL (ref 1–4.8)
ABSOLUTE MONO #: 0.7 K/UL (ref 0.1–1.3)
BASOPHILS # BLD: 0 % (ref 0–2)
BASOPHILS ABSOLUTE: 0 K/UL (ref 0–0.2)
BILIRUBIN URINE: NEGATIVE
COLOR: YELLOW
COMMENT UA: NORMAL
EOSINOPHILS RELATIVE PERCENT: 1 % (ref 0–4)
GLUCOSE ADMINISTRATION: NORMAL
GLUCOSE TOLERANCE SCREEN 50G: 100 MG/DL (ref 70–135)
GLUCOSE URINE: NEGATIVE
HCT VFR BLD CALC: 36.8 % (ref 36–46)
HEMOGLOBIN: 12.2 G/DL (ref 12–16)
KETONES, URINE: NEGATIVE
LEUKOCYTE ESTERASE, URINE: NEGATIVE
LYMPHOCYTES # BLD: 16 % (ref 25–45)
MCH RBC QN AUTO: 29.4 PG (ref 26–34)
MCHC RBC AUTO-ENTMCNC: 33.2 G/DL (ref 31–37)
MCV RBC AUTO: 88.6 FL (ref 80–100)
MONOCYTES # BLD: 6 % (ref 2–8)
NITRITE, URINE: NEGATIVE
PDW BLD-RTO: 13.2 % (ref 11.5–14.9)
PH UA: 8 (ref 5–8)
PLATELET # BLD: 249 K/UL (ref 150–450)
PMV BLD AUTO: 8.2 FL (ref 6–12)
PROTEIN UA: NEGATIVE
RBC # BLD: 4.16 M/UL (ref 4–5.2)
SEG NEUTROPHILS: 77 % (ref 34–64)
SEGMENTED NEUTROPHILS ABSOLUTE COUNT: 8.1 K/UL (ref 1.3–9.1)
SPECIFIC GRAVITY UA: 1.02 (ref 1–1.03)
TURBIDITY: CLEAR
URINE HGB: NEGATIVE
UROBILINOGEN, URINE: NORMAL
WBC # BLD: 10.5 K/UL (ref 4.5–13.5)

## 2022-04-18 PROCEDURE — 85025 COMPLETE CBC W/AUTO DIFF WBC: CPT

## 2022-04-18 PROCEDURE — 82950 GLUCOSE TEST: CPT

## 2022-04-18 PROCEDURE — 36415 COLL VENOUS BLD VENIPUNCTURE: CPT

## 2022-04-18 PROCEDURE — 81003 URINALYSIS AUTO W/O SCOPE: CPT

## 2022-04-27 ENCOUNTER — ROUTINE PRENATAL (OUTPATIENT)
Dept: OBGYN CLINIC | Age: 22
End: 2022-04-27
Payer: COMMERCIAL

## 2022-04-27 VITALS
SYSTOLIC BLOOD PRESSURE: 118 MMHG | WEIGHT: 201 LBS | HEART RATE: 86 BPM | DIASTOLIC BLOOD PRESSURE: 72 MMHG | BODY MASS INDEX: 32.44 KG/M2

## 2022-04-27 DIAGNOSIS — O44.40 LOW-LYING PLACENTA: Primary | ICD-10-CM

## 2022-04-27 DIAGNOSIS — Z34.00 PRIMIGRAVIDA, ANTEPARTUM: ICD-10-CM

## 2022-04-27 DIAGNOSIS — O43.119 ANTEPARTUM PLACENTA CIRCUMVALLATA: ICD-10-CM

## 2022-04-27 DIAGNOSIS — Z23 NEED FOR TDAP VACCINATION: ICD-10-CM

## 2022-04-27 DIAGNOSIS — Z3A.30 30 WEEKS GESTATION OF PREGNANCY: ICD-10-CM

## 2022-04-27 PROCEDURE — 99213 OFFICE O/P EST LOW 20 MIN: CPT | Performed by: NURSE PRACTITIONER

## 2022-04-27 PROCEDURE — 90471 IMMUNIZATION ADMIN: CPT | Performed by: NURSE PRACTITIONER

## 2022-04-27 PROCEDURE — 90715 TDAP VACCINE 7 YRS/> IM: CPT | Performed by: NURSE PRACTITIONER

## 2022-04-27 NOTE — PROGRESS NOTES
Lindsay Matthew is a 24 y.o. female 27w4d        OB History    Para Term  AB Living   1             SAB IAB Ectopic Molar Multiple Live Births                    # Outcome Date GA Lbr Daniel/2nd Weight Sex Delivery Anes PTL Lv   1 Current                Vitals  BP: 118/72  Weight: 201 lb (91.2 kg)  Pulse: 86  Patient Position: Sitting  Albumin: Negative  Glucose: Negative      The patient was seen and evaluated. There was positive fetal movements. No contractions or leakage of fluid. Signs and symptoms of  labor as well as labor were reviewed. The S/S of Pre-Eclampsia were reviewed with the patient in detail. She is to report any of these if they occur. She currently denies any of these. The patient had her 28 week labs completed.   Hospital Outpatient Visit on 2022   Component Date Value Ref Range Status    GLU ADMN 2022 Glucola   Final    Glucose tolerance screen 50g 2022 100  70 - 135 mg/dL Final    WBC 2022 10.5  4.5 - 13.5 k/uL Final    RBC 2022 4.16  4.0 - 5.2 m/uL Final    Hemoglobin 2022 12.2  12.0 - 16.0 g/dL Final    Hematocrit 2022 36.8  36 - 46 % Final    MCV 2022 88.6  80 - 100 fL Final    MCH 2022 29.4  26 - 34 pg Final    MCHC 2022 33.2  31 - 37 g/dL Final    RDW 2022 13.2  11.5 - 14.9 % Final    Platelets  249  150 - 450 k/uL Final    MPV 2022 8.2  6.0 - 12.0 fL Final    Seg Neutrophils 2022 77* 34 - 64 % Final    Lymphocytes 2022 16* 25 - 45 % Final    Monocytes 2022 6  2 - 8 % Final    Eosinophils % 2022 1  0 - 4 % Final    Basophils 2022 0  0 - 2 % Final    Segs Absolute 2022 8.10  1.3 - 9.1 k/uL Final    Absolute Lymph # 2022 1.60  1.0 - 4.8 k/uL Final    Absolute Mono # 2022 0.70  0.1 - 1.3 k/uL Final    Absolute Eos # 2022 0.10  0.0 - 0.4 k/uL Final    Basophils Absolute 2022 0.00  0.0 - 0.2 k/uL Final  Color, UA 2022 Yellow  Yellow Final    Turbidity UA 2022 Clear  Clear Final    Glucose, Ur 2022 NEGATIVE  NEGATIVE Final    Bilirubin Urine 2022 NEGATIVE  NEGATIVE Final    Ketones, Urine 2022 NEGATIVE  NEGATIVE Final    Specific Gravity, UA 2022 1.017  1.000 - 1.030 Final    Urine Hgb 2022 NEGATIVE  NEGATIVE Final    pH, UA 2022 8.0  5.0 - 8.0 Final    Protein, UA 2022 NEGATIVE  NEGATIVE Final    Urobilinogen, Urine 2022 Normal  Normal Final    Nitrite, Urine 2022 NEGATIVE  NEGATIVE Final    Leukocyte Esterase, Urine 2022 NEGATIVE  NEGATIVE Final    Urinalysis Comments 2022 Microscopic exam not performed based on chemical results unless requested in original order. Final   ]    22 patient accepted and given tdap vaccine       T-Dap Vaccine (27-36 weeks): completed    The patient was instructed on fetal kick counts and was given a kick sheet to complete every 8 hours. She was instructed that the baby should move at a minimum of ten times within one hour after a meal. The patient was instructed to lay down on her left side twenty minutes after eating and count movements for up to one hour with a target value of ten movements. She was instructed to notify the office if she did not make that target after two attempts or if after any attempt there was less than four movements. The patient reports that the targets have been made Yes.  Testing:  Not indicated    Assessment:  1Sparkle Tejeda is a 24 y.o. female  2.   3. 30w2d    Patient Active Problem List    Diagnosis Date Noted    Low-lying placenta 2022     Priority: High     Pelvic rest/ no heavy lifting       Primigravida, antepartum 2022     Priority: High    Antepartum placenta circumvallata 2022        Diagnosis Orders   1.  Low-lying placenta     2. 30 weeks gestation of pregnancy  Tdap (age 6y and older) IM ([address]) 3. Need for Tdap vaccination  Tdap (age 6y and older) IM ([address])   4. Primigravida, antepartum     5. Antepartum placenta circumvallata               Plan:  The patient will return to the office for her next visit in 2 weeks. See antepartum flow sheet. Keep appt with MFM  Reviewed s/sx of  labor and preeclampsia  Continue [facility]       Testing Indicated: No  Scheduled with Nursing-Pt notified: No      Patient was seen with total face to face time of 20 minutes. More than 50% of this visit was on counseling and education regarding her    Diagnosis Orders   1. Low-lying placenta     2. 30 weeks gestation of pregnancy  Tdap (age 6y and older) IM ([address])   3. Need for Tdap vaccination  Tdap (age 6y and older) IM ([address])   4. Primigravida, antepartum     5. Antepartum placenta circumvallata      and her options. She was also counseled on her preventative health maintenance recommendations and follow-up.

## 2022-05-09 ENCOUNTER — ROUTINE PRENATAL (OUTPATIENT)
Dept: PERINATAL CARE | Age: 22
End: 2022-05-09
Payer: COMMERCIAL

## 2022-05-09 VITALS
HEIGHT: 66 IN | SYSTOLIC BLOOD PRESSURE: 138 MMHG | TEMPERATURE: 97.2 F | RESPIRATION RATE: 20 BRPM | BODY MASS INDEX: 33.27 KG/M2 | DIASTOLIC BLOOD PRESSURE: 80 MMHG | WEIGHT: 207 LBS | HEART RATE: 115 BPM

## 2022-05-09 DIAGNOSIS — O43.113 CIRCUMVALLATE PLACENTA IN THIRD TRIMESTER: ICD-10-CM

## 2022-05-09 DIAGNOSIS — Z3A.32 32 WEEKS GESTATION OF PREGNANCY: ICD-10-CM

## 2022-05-09 DIAGNOSIS — O26.03 EXCESSIVE WEIGHT GAIN DURING PREGNANCY IN THIRD TRIMESTER: Primary | ICD-10-CM

## 2022-05-09 DIAGNOSIS — Z13.89 ENCOUNTER FOR ROUTINE SCREENING FOR MALFORMATION USING ULTRASONICS: ICD-10-CM

## 2022-05-09 LAB
ABDOMINAL CIRCUMFERENCE: NORMAL
BIPARIETAL DIAMETER: NORMAL
ESTIMATED FETAL WEIGHT: NORMAL
FEMORAL DIAMETER: NORMAL
HC/AC: NORMAL
HEAD CIRCUMFERENCE: NORMAL

## 2022-05-09 PROCEDURE — 76819 FETAL BIOPHYS PROFIL W/O NST: CPT | Performed by: OBSTETRICS & GYNECOLOGY

## 2022-05-09 PROCEDURE — 76805 OB US >/= 14 WKS SNGL FETUS: CPT | Performed by: OBSTETRICS & GYNECOLOGY

## 2022-05-09 PROCEDURE — 99999 PR OFFICE/OUTPT VISIT,PROCEDURE ONLY: CPT | Performed by: OBSTETRICS & GYNECOLOGY

## 2022-05-09 NOTE — PROGRESS NOTES
Please refer to attached ultrasound report for doctor's evaluation of the clinical information obtained by vital signs, ultrasound, and/or non-stress test along with management recommendation. None

## 2022-05-17 ENCOUNTER — ROUTINE PRENATAL (OUTPATIENT)
Dept: OBGYN CLINIC | Age: 22
End: 2022-05-17

## 2022-05-17 VITALS
HEART RATE: 103 BPM | BODY MASS INDEX: 34.22 KG/M2 | WEIGHT: 212 LBS | DIASTOLIC BLOOD PRESSURE: 70 MMHG | SYSTOLIC BLOOD PRESSURE: 118 MMHG

## 2022-05-17 DIAGNOSIS — Z3A.33 33 WEEKS GESTATION OF PREGNANCY: ICD-10-CM

## 2022-05-17 DIAGNOSIS — O43.119 ANTEPARTUM PLACENTA CIRCUMVALLATA: ICD-10-CM

## 2022-05-17 DIAGNOSIS — O44.40 LOW-LYING PLACENTA: ICD-10-CM

## 2022-05-17 DIAGNOSIS — O09.93 HIGH-RISK PREGNANCY IN THIRD TRIMESTER: Primary | ICD-10-CM

## 2022-05-17 PROCEDURE — 0502F SUBSEQUENT PRENATAL CARE: CPT | Performed by: OBSTETRICS & GYNECOLOGY

## 2022-05-17 RX ORDER — FAMOTIDINE 20 MG/1
20 TABLET, FILM COATED ORAL 2 TIMES DAILY
Qty: 60 TABLET | Refills: 3 | Status: SHIPPED | OUTPATIENT
Start: 2022-05-17 | End: 2022-08-05

## 2022-05-17 NOTE — PROGRESS NOTES
Olimpia Hogan is a 24 y.o. female 30w4d        OB History    Para Term  AB Living   1             SAB IAB Ectopic Molar Multiple Live Births                    # Outcome Date GA Lbr Daniel/2nd Weight Sex Delivery Anes PTL Lv   1 Current                Vitals  BP: 118/70  Weight: 212 lb (96.2 kg)  Pulse: 103  Patient Position: Sitting  Albumin: Negative  Glucose: Negative      The patient was seen and evaluated. There was positive fetal movements. No contractions or leakage of fluid. Signs and symptoms of  labor as well as labor were reviewed. The S/S of Pre-Eclampsia were reviewed with the patient in detail. She is to report any of these if they occur. She currently denies any of these. The patient had her 28 week labs completed.   Hospital Outpatient Visit on 2022   Component Date Value Ref Range Status    GLU ADMN 2022 Glucola   Final    Glucose tolerance screen 50g 2022 100  70 - 135 mg/dL Final    WBC 2022 10.5  4.5 - 13.5 k/uL Final    RBC 2022 4.16  4.0 - 5.2 m/uL Final    Hemoglobin 2022 12.2  12.0 - 16.0 g/dL Final    Hematocrit 2022 36.8  36 - 46 % Final    MCV 2022 88.6  80 - 100 fL Final    MCH 2022 29.4  26 - 34 pg Final    MCHC 2022 33.2  31 - 37 g/dL Final    RDW 2022 13.2  11.5 - 14.9 % Final    Platelets  249  150 - 450 k/uL Final    MPV 2022 8.2  6.0 - 12.0 fL Final    Seg Neutrophils 2022 77* 34 - 64 % Final    Lymphocytes 2022 16* 25 - 45 % Final    Monocytes 2022 6  2 - 8 % Final    Eosinophils % 2022 1  0 - 4 % Final    Basophils 2022 0  0 - 2 % Final    Segs Absolute 2022 8.10  1.3 - 9.1 k/uL Final    Absolute Lymph # 2022 1.60  1.0 - 4.8 k/uL Final    Absolute Mono # 2022 0.70  0.1 - 1.3 k/uL Final    Absolute Eos # 2022 0.10  0.0 - 0.4 k/uL Final    Basophils Absolute 2022 0.00  0.0 - 0.2 k/uL Final  Color, UA 2022 Yellow  Yellow Final    Turbidity UA 2022 Clear  Clear Final    Glucose, Ur 2022 NEGATIVE  NEGATIVE Final    Bilirubin Urine 2022 NEGATIVE  NEGATIVE Final    Ketones, Urine 2022 NEGATIVE  NEGATIVE Final    Specific Gravity, UA 2022 1.017  1.000 - 1.030 Final    Urine Hgb 2022 NEGATIVE  NEGATIVE Final    pH, UA 2022 8.0  5.0 - 8.0 Final    Protein, UA 2022 NEGATIVE  NEGATIVE Final    Urobilinogen, Urine 2022 Normal  Normal Final    Nitrite, Urine 2022 NEGATIVE  NEGATIVE Final    Leukocyte Esterase, Urine 2022 NEGATIVE  NEGATIVE Final    Urinalysis Comments 2022 Microscopic exam not performed based on chemical results unless requested in original order. Final   ]    22 patient accepted and given tdap vaccine       T-Dap Vaccine (27-36 weeks): completed    The patient was instructed on fetal kick counts and was given a kick sheet to complete every 8 hours. She was instructed that the baby should move at a minimum of ten times within one hour after a meal. The patient was instructed to lay down on her left side twenty minutes after eating and count movements for up to one hour with a target value of ten movements. She was instructed to notify the office if she did not make that target after two attempts or if after any attempt there was less than four movements. The patient reports that the targets have been made Yes.  Testing: The recommendation to proceed to fetal kick counts every 8 hours daily instead of Non stress testing, (as per Mya RITCHIE, Jeanne Esteves, [employer] guidance for Covid-19 testing, American Journal of Obstetrics & Gynecology, 2020)  Pt has follow up with [employer] [facility] 2022    Assessment:  Fred Leon is a 24 y.o. female  2.    3. 33w1d    Patient Active Problem List    Diagnosis Date Noted    Low-lying placenta 2022     Priority: High Pelvic rest/ no heavy lifting       Primigravida, antepartum 2022     Priority: High    Antepartum placenta circumvallata 2022        Diagnosis Orders   1. High-risk pregnancy in third trimester     2. Low-lying placenta     3. Antepartum placenta circumvallata     4. 33 weeks gestation of pregnancy               Plan:  The patient will return to the office for her next visit in 2 weeks. See antepartum flow sheet.  Testing Indicated: No  Scheduled with Nursing-Pt notified: No      Patient was seen with total face to face time of 20 minutes. More than 50% of this visit was on counseling and education regarding her    Diagnosis Orders   1. High-risk pregnancy in third trimester     2. Low-lying placenta     3. Antepartum placenta circumvallata     4. 33 weeks gestation of pregnancy      and her options. She was also counseled on her preventative health maintenance recommendations and follow-up.

## 2022-05-31 ENCOUNTER — ROUTINE PRENATAL (OUTPATIENT)
Dept: OBGYN CLINIC | Age: 22
End: 2022-05-31

## 2022-05-31 VITALS — DIASTOLIC BLOOD PRESSURE: 78 MMHG | BODY MASS INDEX: 34.7 KG/M2 | SYSTOLIC BLOOD PRESSURE: 124 MMHG | WEIGHT: 215 LBS

## 2022-05-31 DIAGNOSIS — Z3A.35 35 WEEKS GESTATION OF PREGNANCY: Primary | ICD-10-CM

## 2022-05-31 DIAGNOSIS — O43.119 ANTEPARTUM PLACENTA CIRCUMVALLATA: ICD-10-CM

## 2022-05-31 PROCEDURE — 0502F SUBSEQUENT PRENATAL CARE: CPT | Performed by: NURSE PRACTITIONER

## 2022-05-31 NOTE — PROGRESS NOTES
Sameer Larry is a 24 y.o. female 35w1d        OB History    Para Term  AB Living   1             SAB IAB Ectopic Molar Multiple Live Births                    # Outcome Date GA Lbr Daniel/2nd Weight Sex Delivery Anes PTL Lv   1 Current                Vitals  BP: 124/78  Weight: 215 lb (97.5 kg)  Patient Position: Sitting  Albumin: Negative  Glucose: Negative      The patient was seen and evaluated. There was positive fetal movements. No contractions or leakage of fluid. Signs and symptoms of  labor as well as labor were reviewed. The S/S of Pre-Eclampsia were reviewed with the patient in detail. She is to report any of these if they occur. She currently denies any of these. The patient had her 28 week labs completed. No visits with results within 5 Week(s) from this visit.    Latest known visit with results is:   Hospital Outpatient Visit on 2022   Component Date Value Ref Range Status    GLU ADMN 2022 Glucola   Final    Glucose tolerance screen 50g 2022 100  70 - 135 mg/dL Final    WBC 2022 10.5  4.5 - 13.5 k/uL Final    RBC 2022 4.16  4.0 - 5.2 m/uL Final    Hemoglobin 2022 12.2  12.0 - 16.0 g/dL Final    Hematocrit 2022 36.8  36 - 46 % Final    MCV 2022 88.6  80 - 100 fL Final    MCH 2022 29.4  26 - 34 pg Final    MCHC 2022 33.2  31 - 37 g/dL Final    RDW 2022 13.2  11.5 - 14.9 % Final    Platelets  249  150 - 450 k/uL Final    MPV 2022 8.2  6.0 - 12.0 fL Final    Seg Neutrophils 2022 77* 34 - 64 % Final    Lymphocytes 2022 16* 25 - 45 % Final    Monocytes 2022 6  2 - 8 % Final    Eosinophils % 2022 1  0 - 4 % Final    Basophils 2022 0  0 - 2 % Final    Segs Absolute 2022 8.10  1.3 - 9.1 k/uL Final    Absolute Lymph # 2022 1.60  1.0 - 4.8 k/uL Final    Absolute Mono # 2022 0.70  0.1 - 1.3 k/uL Final    Absolute Eos # 2022 0. 10  0.0 - 0.4 k/uL Final    Basophils Absolute 2022 0.00  0.0 - 0.2 k/uL Final    Color, UA 2022 Yellow  Yellow Final    Turbidity UA 2022 Clear  Clear Final    Glucose, Ur 2022 NEGATIVE  NEGATIVE Final    Bilirubin Urine 2022 NEGATIVE  NEGATIVE Final    Ketones, Urine 2022 NEGATIVE  NEGATIVE Final    Specific Gravity, UA 2022 1.017  1.000 - 1.030 Final    Urine Hgb 2022 NEGATIVE  NEGATIVE Final    pH, UA 2022 8.0  5.0 - 8.0 Final    Protein, UA 2022 NEGATIVE  NEGATIVE Final    Urobilinogen, Urine 2022 Normal  Normal Final    Nitrite, Urine 2022 NEGATIVE  NEGATIVE Final    Leukocyte Esterase, Urine 2022 NEGATIVE  NEGATIVE Final    Urinalysis Comments 2022 Microscopic exam not performed based on chemical results unless requested in original order. Final   ]    22 patient accepted and given tdap vaccine       T-Dap Vaccine (27-36 weeks): completed    The patient was instructed on fetal kick counts and was given a kick sheet to complete every 8 hours. She was instructed that the baby should move at a minimum of ten times within one hour after a meal. The patient was instructed to lay down on her left side twenty minutes after eating and count movements for up to one hour with a target value of ten movements. She was instructed to notify the office if she did not make that target after two attempts or if after any attempt there was less than four movements. The patient reports that the targets have been made Yes.  Testing:  Not indicated    Assessment:  1Sparkle Ng is a 24 y.o. female  2.    3. 35w1d    Patient Active Problem List    Diagnosis Date Noted    Antepartum placenta circumvallata 2022     Priority: High    Low-lying placenta 2022     Priority: High     Pelvic rest/ no heavy lifting       Primigravida, antepartum 2022     Priority: High        Diagnosis Orders 1. 35 weeks gestation of pregnancy     2. Low-lying placenta     3. Antepartum placenta circumvallata               Plan:  The patient will return to the office for her next visit in 1 weeks. See antepartum flow sheet. Denies S/S of labor.  Testing Indicated: No  Scheduled with Nursing-Pt notified: No      Patient was seen with total face to face time of 20 minutes. More than 50% of this visit was on counseling and education regarding her    Diagnosis Orders   1. 35 weeks gestation of pregnancy     2. Low-lying placenta     3. Antepartum placenta circumvallata      and her options. She was also counseled on her preventative health maintenance recommendations and follow-up.

## 2022-06-06 ENCOUNTER — ROUTINE PRENATAL (OUTPATIENT)
Dept: OBGYN CLINIC | Age: 22
End: 2022-06-06

## 2022-06-06 VITALS
HEART RATE: 96 BPM | WEIGHT: 217 LBS | BODY MASS INDEX: 35.02 KG/M2 | DIASTOLIC BLOOD PRESSURE: 70 MMHG | SYSTOLIC BLOOD PRESSURE: 116 MMHG

## 2022-06-06 DIAGNOSIS — O44.40 LOW-LYING PLACENTA: ICD-10-CM

## 2022-06-06 DIAGNOSIS — Z3A.36 36 WEEKS GESTATION OF PREGNANCY: Primary | ICD-10-CM

## 2022-06-06 DIAGNOSIS — O43.119 ANTEPARTUM PLACENTA CIRCUMVALLATA: ICD-10-CM

## 2022-06-06 PROCEDURE — 0502F SUBSEQUENT PRENATAL CARE: CPT | Performed by: OBSTETRICS & GYNECOLOGY

## 2022-06-06 NOTE — PROGRESS NOTES
Melba Ng is a 24 y.o. female 44w0d        OB History    Para Term  AB Living   1             SAB IAB Ectopic Molar Multiple Live Births                    # Outcome Date GA Lbr Daniel/2nd Weight Sex Delivery Anes PTL Lv   1 Current                  Vitals  BP: 116/70  Weight: 217 lb (98.4 kg)  Heart Rate: 96  Patient Position: Sitting  Albumin: Negative  Glucose: Negative      The patient was seen and evaluated. There was positive fetal movements. No contractions or leakage of fluid. Signs and symptoms of labor were reviewed. The S/S of Pre-Eclampsia were reviewed with the patient in detail. She is to report any of these if they occur. She currently denies any of these. The patient was instructed on fetal kick counts and was given a kick sheet to complete every 8 hours. She was instructed that the baby should move at a minimum of ten times within one hour after a meal. The patient was instructed to lay down on her left side twenty minutes after eating and count movements for up to one hour with a target value of ten movements. She was instructed to notify the office if she did not make that target after two attempts or if after any attempt there was less than four movements. The patient reports that the targets have been made Yes.    22 patient accepted and given tdap vaccine       T-Dap Vaccine (27-36 weeks) Completed: Completed     Allergies: Allergies as of 2022    (No Known Allergies)       Group Beta Strep collection was completed. Yes   GBS Results:   No visits with results within 1 Week(s) from this visit.    Latest known visit with results is:   Hospital Outpatient Visit on 2022   Component Date Value Ref Range Status    GLU ADMN 2022 Glucola   Final    Glucose tolerance screen 50g 2022 100  70 - 135 mg/dL Final    WBC 2022 10.5  4.5 - 13.5 k/uL Final    RBC 2022 4.16  4.0 - 5.2 m/uL Final    Hemoglobin 2022 12.2  12.0 - 16.0 g/dL Final    Hematocrit 04/18/2022 36.8  36 - 46 % Final    MCV 04/18/2022 88.6  80 - 100 fL Final    MCH 04/18/2022 29.4  26 - 34 pg Final    MCHC 04/18/2022 33.2  31 - 37 g/dL Final    RDW 04/18/2022 13.2  11.5 - 14.9 % Final    Platelets 25/35/4751 249  150 - 450 k/uL Final    MPV 04/18/2022 8.2  6.0 - 12.0 fL Final    Seg Neutrophils 04/18/2022 77* 34 - 64 % Final    Lymphocytes 04/18/2022 16* 25 - 45 % Final    Monocytes 04/18/2022 6  2 - 8 % Final    Eosinophils % 04/18/2022 1  0 - 4 % Final    Basophils 04/18/2022 0  0 - 2 % Final    Segs Absolute 04/18/2022 8.10  1.3 - 9.1 k/uL Final    Absolute Lymph # 04/18/2022 1.60  1.0 - 4.8 k/uL Final    Absolute Mono # 04/18/2022 0.70  0.1 - 1.3 k/uL Final    Absolute Eos # 04/18/2022 0.10  0.0 - 0.4 k/uL Final    Basophils Absolute 04/18/2022 0.00  0.0 - 0.2 k/uL Final    Color, UA 04/18/2022 Yellow  Yellow Final    Turbidity UA 04/18/2022 Clear  Clear Final    Glucose, Ur 04/18/2022 NEGATIVE  NEGATIVE Final    Bilirubin Urine 04/18/2022 NEGATIVE  NEGATIVE Final    Ketones, Urine 04/18/2022 NEGATIVE  NEGATIVE Final    Specific Gravity, UA 04/18/2022 1.017  1.000 - 1.030 Final    Urine Hgb 04/18/2022 NEGATIVE  NEGATIVE Final    pH, UA 04/18/2022 8.0  5.0 - 8.0 Final    Protein, UA 04/18/2022 NEGATIVE  NEGATIVE Final    Urobilinogen, Urine 04/18/2022 Normal  Normal Final    Nitrite, Urine 04/18/2022 NEGATIVE  NEGATIVE Final    Leukocyte Esterase, Urine 04/18/2022 NEGATIVE  NEGATIVE Final    Urinalysis Comments 04/18/2022 Microscopic exam not performed based on chemical results unless requested in original order. Final   ]  Sensitivities for clindamycin and erythromycin were ordered. No      The patient was counseled on the mandatory call ahead policy. She has been instructed to call the office at anytime prior to going into the hospital so the on-call physician may direct her to the appropriate facility for care.  Exceptions were reviewed including but not limited to: Decreased fetal movement, vaginal Bleeding or hemorrhage, trauma, readily expectant delivery, or any instance where she feels 911 should be utilized. The patient was counseled on Labor & Delivery. Route of delivery and counseling on vaginal, operative vaginal, and  sections were completed with the risks of each to both the patient as well as her baby. The possibility of a blood transfusion was discussed as well. The patient was not opposed to receiving a transfusion if needed. The patient was counseled on types of analgesia during labor and is considering either Regional or IV medication the risks, benefits and alternatives were discussed.  Testing:  Not indicated  The recommendation to proceed to fetal kick counts every 8 hours daily instead of Non stress testing, (as per Mya RITCHIE, KARON Brock guidance for Covid-19 testing, American Journal of Obstetrics & Gynecology, 8001)      Assessment:  1. Johanna Martines is a 24 y.o. female  2.   3. 36w0d      Patient Active Problem List    Diagnosis Date Noted    Low-lying placenta 2022     Priority: High     Overview Note:     Pelvic rest/ no heavy lifting       Primigravida, antepartum 2022     Priority: High    Antepartum placenta circumvallata 2022        Diagnosis Orders   1. 36 weeks gestation of pregnancy  Culture, Strep B Screen, Vaginal/Rectal   2. Low-lying placenta     3. Antepartum placenta circumvallata               Plan:  The patient will return to the office for her next visit in 1 weeks. See antepartum flow sheet. Patient was seen with total face to face time of 20 minutes. More than 50% of this visit was on counseling and education regarding her    Diagnosis Orders   1. 36 weeks gestation of pregnancy  Culture, Strep B Screen, Vaginal/Rectal   2. Low-lying placenta     3. Antepartum placenta circumvallata      and her options.  She was also counseled on her preventative health maintenance recommendations and follow-up.

## 2022-06-09 DIAGNOSIS — Z3A.36 36 WEEKS GESTATION OF PREGNANCY: ICD-10-CM

## 2022-06-15 ENCOUNTER — ROUTINE PRENATAL (OUTPATIENT)
Dept: OBGYN CLINIC | Age: 22
End: 2022-06-15

## 2022-06-15 VITALS
WEIGHT: 223 LBS | BODY MASS INDEX: 35.99 KG/M2 | SYSTOLIC BLOOD PRESSURE: 114 MMHG | DIASTOLIC BLOOD PRESSURE: 74 MMHG | HEART RATE: 97 BPM

## 2022-06-15 DIAGNOSIS — O43.113 CIRCUMVALLATE PLACENTA IN THIRD TRIMESTER: Primary | ICD-10-CM

## 2022-06-15 DIAGNOSIS — O44.40 LOW-LYING PLACENTA: ICD-10-CM

## 2022-06-15 DIAGNOSIS — O43.119 ANTEPARTUM PLACENTA CIRCUMVALLATA: ICD-10-CM

## 2022-06-15 PROCEDURE — 0502F SUBSEQUENT PRENATAL CARE: CPT | Performed by: NURSE PRACTITIONER

## 2022-06-15 NOTE — PROGRESS NOTES
Leticia Fairbanks is a 24 y.o. female 42w2d        OB History    Para Term  AB Living   1             SAB IAB Ectopic Molar Multiple Live Births                    # Outcome Date GA Lbr Daniel/2nd Weight Sex Delivery Anes PTL Lv   1 Current                  Vitals  BP: 114/74  Weight: 223 lb (101.2 kg)  Heart Rate: 97  Patient Position: Sitting  Albumin: Negative  Glucose: Negative      The patient was seen and evaluated. There was positive fetal movements. No contractions or leakage of fluid. Signs and symptoms of labor were reviewed. The S/S of Pre-Eclampsia were reviewed with the patient in detail. She is to report any of these if they occur. She currently denies any of these. The patient was instructed on fetal kick counts and was given a kick sheet to complete every 8 hours. She was instructed that the baby should move at a minimum of ten times within one hour after a meal. The patient was instructed to lay down on her left side twenty minutes after eating and count movements for up to one hour with a target value of ten movements. She was instructed to notify the office if she did not make that target after two attempts or if after any attempt there was less than four movements. The patient reports that the targets have been made Yes.    22 patient accepted and given tdap vaccine       T-Dap Vaccine (27-36 weeks) Completed: Yes    Allergies: Allergies as of 06/15/2022    (No Known Allergies)       Group Beta Strep collection was completed. Yes   GBS Results:   No visits with results within 1 Week(s) from this visit.    Latest known visit with results is:   Hospital Outpatient Visit on 2022   Component Date Value Ref Range Status    GLU ADMN 2022 Glucola   Final    Glucose tolerance screen 50g 2022 100  70 - 135 mg/dL Final    WBC 2022 10.5  4.5 - 13.5 k/uL Final    RBC 2022 4.16  4.0 - 5.2 m/uL Final    Hemoglobin 2022 12.2  12.0 - 16.0 g/dL Final    Hematocrit 04/18/2022 36.8  36 - 46 % Final    MCV 04/18/2022 88.6  80 - 100 fL Final    MCH 04/18/2022 29.4  26 - 34 pg Final    MCHC 04/18/2022 33.2  31 - 37 g/dL Final    RDW 04/18/2022 13.2  11.5 - 14.9 % Final    Platelets 78/74/2386 249  150 - 450 k/uL Final    MPV 04/18/2022 8.2  6.0 - 12.0 fL Final    Seg Neutrophils 04/18/2022 77* 34 - 64 % Final    Lymphocytes 04/18/2022 16* 25 - 45 % Final    Monocytes 04/18/2022 6  2 - 8 % Final    Eosinophils % 04/18/2022 1  0 - 4 % Final    Basophils 04/18/2022 0  0 - 2 % Final    Segs Absolute 04/18/2022 8.10  1.3 - 9.1 k/uL Final    Absolute Lymph # 04/18/2022 1.60  1.0 - 4.8 k/uL Final    Absolute Mono # 04/18/2022 0.70  0.1 - 1.3 k/uL Final    Absolute Eos # 04/18/2022 0.10  0.0 - 0.4 k/uL Final    Basophils Absolute 04/18/2022 0.00  0.0 - 0.2 k/uL Final    Color, UA 04/18/2022 Yellow  Yellow Final    Turbidity UA 04/18/2022 Clear  Clear Final    Glucose, Ur 04/18/2022 NEGATIVE  NEGATIVE Final    Bilirubin Urine 04/18/2022 NEGATIVE  NEGATIVE Final    Ketones, Urine 04/18/2022 NEGATIVE  NEGATIVE Final    Specific Gravity, UA 04/18/2022 1.017  1.000 - 1.030 Final    Urine Hgb 04/18/2022 NEGATIVE  NEGATIVE Final    pH, UA 04/18/2022 8.0  5.0 - 8.0 Final    Protein, UA 04/18/2022 NEGATIVE  NEGATIVE Final    Urobilinogen, Urine 04/18/2022 Normal  Normal Final    Nitrite, Urine 04/18/2022 NEGATIVE  NEGATIVE Final    Leukocyte Esterase, Urine 04/18/2022 NEGATIVE  NEGATIVE Final    Urinalysis Comments 04/18/2022 Microscopic exam not performed based on chemical results unless requested in original order. Final   ]  Sensitivities for clindamycin and erythromycin were ordered. No      The patient was counseled on the mandatory call ahead policy. She has been instructed to call the office at anytime prior to going into the hospital so the on-call physician may direct her to the appropriate facility for care.  Exceptions were

## 2022-06-19 ENCOUNTER — HOSPITAL ENCOUNTER (INPATIENT)
Age: 22
LOS: 1 days | Discharge: HOME | DRG: 832 | End: 2022-06-19
Attending: OBSTETRICS & GYNECOLOGY | Admitting: OBSTETRICS & GYNECOLOGY
Payer: COMMERCIAL

## 2022-06-19 VITALS
RESPIRATION RATE: 16 BRPM | DIASTOLIC BLOOD PRESSURE: 75 MMHG | SYSTOLIC BLOOD PRESSURE: 116 MMHG | OXYGEN SATURATION: 96 % | TEMPERATURE: 98.1 F | HEART RATE: 102 BPM

## 2022-06-19 PROBLEM — O26.899 PELVIC PRESSURE IN PREGNANCY: Status: ACTIVE | Noted: 2022-06-19

## 2022-06-19 PROBLEM — R10.2 PELVIC PRESSURE IN PREGNANCY: Status: ACTIVE | Noted: 2022-06-19

## 2022-06-19 LAB
BACTERIA: ABNORMAL
BILIRUBIN URINE: NEGATIVE
CASTS UA: ABNORMAL /LPF
COLOR: YELLOW
EPITHELIAL CELLS UA: ABNORMAL /HPF
GLUCOSE URINE: NEGATIVE
KETONES, URINE: NEGATIVE
LEUKOCYTE ESTERASE, URINE: ABNORMAL
NITRITE, URINE: NEGATIVE
PH UA: 7 (ref 5–8)
PROTEIN UA: NEGATIVE
RBC UA: ABNORMAL /HPF
SPECIFIC GRAVITY UA: 1.01 (ref 1–1.03)
TURBIDITY: ABNORMAL
URINE HGB: NEGATIVE
UROBILINOGEN, URINE: NORMAL
WBC UA: ABNORMAL /HPF

## 2022-06-19 PROCEDURE — 99213 OFFICE O/P EST LOW 20 MIN: CPT

## 2022-06-19 PROCEDURE — 99219 PR INITIAL OBSERVATION CARE/DAY 50 MINUTES: CPT | Performed by: OBSTETRICS & GYNECOLOGY

## 2022-06-19 PROCEDURE — 1220000000 HC SEMI PRIVATE OB R&B

## 2022-06-19 PROCEDURE — 87086 URINE CULTURE/COLONY COUNT: CPT

## 2022-06-19 PROCEDURE — 81001 URINALYSIS AUTO W/SCOPE: CPT

## 2022-06-19 RX ORDER — ACETAMINOPHEN 500 MG
1000 TABLET ORAL ONCE
Status: DISCONTINUED | OUTPATIENT
Start: 2022-06-19 | End: 2022-06-19 | Stop reason: HOSPADM

## 2022-06-19 NOTE — FLOWSHEET NOTE
Dr. Destin Buck notified of pt arrival. Orders received to r/o labor d/t pelvic pressure and to send urinalysis.

## 2022-06-19 NOTE — FLOWSHEET NOTE
Patient admitted to room 167 from ED registration ambulatory. Here with c/o \"pelvic pressure\" and \"discolored urine\" and decreased fetal movement. Denies ROM or vaginal bleeding. Denies N/V/D. Denies fever/chills. Request for urine sample made. Consent obtained. Patient verbalizes understanding and acceptance. Assisted into bed, Siderails up x2. Call light in reach. Bed in low position. Oriented to room, surroundings, call system and plan of care. Patient verbalizes understanding. EFM applied and monitor test completed/passed.

## 2022-06-19 NOTE — FLOWSHEET NOTE
Breastfeeding education information given to patient and she verbalizes understanding about the following:    Skin to Skin Contact for You and Your Baby. Benefits of breastfeeding. Risks of formula given and discussed with mother. What do the experts say about the use of pacifiers/supplementation of a  infant? Discharge teaching and instructions completed. AVS reviewed. Patient voiced understanding regarding follow up appointments, and care of self at home. Discharged home.

## 2022-06-19 NOTE — DISCHARGE SUMMARY
Obstetric Discharge Summary    Patient Name: Yung Simpson  Patient : 2000  Primary Care Physician: No primary care provider on file. Principal Diagnosis  IUP 37w6d weeks    Other Diagnosis:   Pelvic pressure in pregnancy [O26.899, R10.2]  Patient Active Problem List   Diagnosis    Primigravida, antepartum    Antepartum placenta circumvallata    Low-lying placenta    Pelvic pressure in pregnancy         Infection: No  Hospital Acquired: No    Surgical Operations & Procedures    Consultations: none    Pertinent Findings & Procedures:   Yung Simpson is a 24 y.o. female at 41w10d presents with pelvic pressure, contractions, decreased fetal movement ; received PO hydration, cEFM/TOCO;  BPP 8 Boby 14.77, no cervical change during admission         Course of patient: Normal    Discharge to: Home    Readmission planned: No    Recommendations on Discharge:     Meds:   Current Discharge Medication List      CONTINUE these medications which have NOT CHANGED    Details   famotidine (PEPCID) 20 MG tablet Take 1 tablet by mouth 2 times daily  Qty: 60 tablet, Refills: 3      Prenatal Vit-Fe Fumarate-FA (PRENATAL VITAMINS PO) Take 1 tablet by mouth daily           Activity: pelvic rest x 6 weeks, no driving on narcotics, no lifting greater than 15 lbs  Diet: regular diet  Follow up: 2 weeks     Condition on discharge: good and stable     No discharge date for patient encounter. Parish Acuña DO      Comments:  Home care, Follow-up care and birth control were reviewed. Signs and symptoms of mastitis and Post Partum Depression were reviewed. The patient is to notify her physician if any of these occur. The patient was counseled on secondary smoke risks and the increased risk of sudden infant death syndrome and respiratory problems to her baby with exposure. She was counseled on various alternate recommendations to decrease the exposure to secondary smoke to her children.

## 2022-06-19 NOTE — PROGRESS NOTES
Pt. Reevaluated and no cervical change per RN    BPP 8/8   SILVIA 14.77    She is still feeling some contractions, but feels improvement and feels fetal movement. She is requesting D/C home at this time and will follow up closely with OB provider.     Parish Acuña,

## 2022-06-19 NOTE — PLAN OF CARE
Problem: Pain  Goal: Verbalizes/displays adequate comfort level or baseline comfort level  Outcome: Completed

## 2022-06-19 NOTE — H&P
OBSTETRICAL HISTORY AND PHYSICAL    Provider:  Sally Metzger DO      Date: 2022  Time: 7:57 AM    Patient Name: Nena Stone  Patient : 2000  Room/Bed: 59/3275-09  Admission Date/Time: 2022  5:00 AM  MRN #: [id number]  [facility] #: [id number]    Primary Care Physician: No primary care provider on file. Grace Chou is a 24 y.o. female       Chief Complaint:  contractions, pelvic pressure, decreased fetal movement    HPI: Nena Stone is a 24 y.o. female who presents with contractions that are irregular and pelvic pressure. She and her partner at bedside state that she was not feeling adequate fetal movements and they wanted to come in to be evaluated. The contractions started overnight and continued to this morning. OB History:   OB History    Para Term  AB Living   1 0 0 0 0 0   SAB IAB Ectopic Molar Multiple Live Births   0 0 0 0 0 0      # Outcome Date GA Lbr Daniel/2nd Weight Sex Delivery Anes PTL Lv   1 Current                Contractions: Yes  Rupture Of Membranes: No  Bleeding: No    Estimated Gestational Age:  No LMP recorded (approximate). Patient is pregnant. Gestational Age: 41w10d    Estimated Date of Delivery: 22    Pregnancy Risk factors:  Patient Active Problem List    Diagnosis Date Noted    Low-lying placenta 2022     Priority: High     Overview Note:     Pelvic rest/ no heavy lifting       Primigravida, antepartum 2022     Priority: High    Pelvic pressure in pregnancy 2022     Priority: Medium    Antepartum placenta circumvallata 2022           Allergies: Allergies as of 2022    (No Known Allergies)       Medications:  No current facility-administered medications on file prior to encounter.      Current Outpatient Medications on File Prior to Encounter   Medication Sig Dispense Refill    famotidine (PEPCID) 20 MG tablet Take 1 tablet by mouth 2 times daily 60 tablet 3    Prenatal Vit-Fe Fumarate-FA (PRENATAL VITAMINS PO) Take 1 tablet by mouth daily            Steroids Given In This Pregnancy:  no     Past Medical History:   Diagnosis Date    Anemia     Atopic dermatitis 10/23/2011    Depression     anxiety    Multiple fractures 10/23/2011    Otitis media, left 1/10/2012    Sports physical 10/17/2016    Sprain of interphalangeal joint of right thumb 2021    Vasovagal near syncope 3/28/2017       History reviewed. No pertinent surgical history.     OB History    Para Term  AB Living   1 0 0 0 0 0   SAB IAB Ectopic Molar Multiple Live Births   0 0 0 0 0 0      # Outcome Date GA Lbr Daniel/2nd Weight Sex Delivery Anes PTL Lv   1 Current                Family History   Problem Relation Age of Onset    Rheum Arthritis Mother         psoriatic arthritis    Hypertension Mother     Diabetes Mother     Heart Disease Mother         by pass    Irritable Bowel Syndrome Mother     Hypertension Father     Asthma Sister     Arthritis Maternal Grandmother     Asthma Maternal Grandmother     Heart Disease Maternal Grandfather     Diabetes Maternal Grandfather     Stroke Paternal Grandmother     Cancer Paternal Grandfather         prostate    Heart Disease Paternal Grandfather         arrythmia    Diabetes Paternal Grandfather     Bipolar Disorder Maternal Aunt          Social History     Socioeconomic History    Marital status: Single     Spouse name: Not on file    Number of children: Not on file    Years of education: Not on file    Highest education level: Not on file   Occupational History    Not on file   Tobacco Use    Smoking status: Passive Smoke Exposure - Never Smoker    Smokeless tobacco: Never Used    Tobacco comment: dad smokes outside   Vaping Use    Vaping Use: Never used   Substance and Sexual Activity    Alcohol use: Not Currently     Alcohol/week: 0.0 standard drinks    Drug use: Not Currently  Sexual activity: Never   Other Topics Concern    Not on file   Social History Narrative    Not on file     Social Determinants of Health     Financial Resource Strain:     Difficulty of Paying Living Expenses: Not on file   Food Insecurity:     Worried About Running Out of Food in the Last Year: Not on file    Heron of Food in the Last Year: Not on file   Transportation Needs:     Lack of Transportation (Medical): Not on file    Lack of Transportation (Non-Medical): Not on file   Physical Activity:     Days of Exercise per Week: Not on file    Minutes of Exercise per Session: Not on file   Stress:     Feeling of Stress : Not on file   Social Connections:     Frequency of Communication with Friends and Family: Not on file    Frequency of Social Gatherings with Friends and Family: Not on file    Attends Alevism Services: Not on file    Active Member of [address] [place] [address] or Organizations: Not on file    Attends Club or Organization Meetings: Not on file    Marital Status: Not on file   Intimate Partner Violence:     Fear of Current or Ex-Partner: Not on file    Emotionally Abused: Not on file    Physically Abused: Not on file    Sexually Abused: Not on file   Housing Stability:     Unable to Pay for Housing in the Last Year: Not on file    Number of [place] in the Last Year: Not on file    Unstable Housing in the Last Year: Not on file       Review Of Systems:  A minimum of an eleven point review of systems was completed. Review Of Systems (11 point):  Constitutional: No fever, chills or malaise;  No weight change or fatigue  Head and Eyes: No vision, Headache, Dizziness or trauma in last 12 months  ENT ROS: No hearing, Tinnitis, sinus or taste problems  Hematological and Lymphatic ROS:No Lymphoma, Von Willebrand's, Hemophillia or Bleeding History  Psych ROS: No Depression, Homicidal thoughts,suicidal thoughts, or anxiety  Breast ROS: No prior breast abnormalities or lumps  Respiratory ROS: No SOB, Pneumoniae,Cough, or Pulmonary Embolism History  Cardiovascular ROS: No Chest Pain with Exertion, Palpitations, Syncope, Edema, Arrhythmia  Gastrointestinal ROS: No Indigestion, Heartburn, Nausea, vomiting, Diarrhea, Constipation,or Bowel Changes; No Bloody Stools or melena  Genito-Urinary ROS: No Dysuria, Hematuria or Nocturia.  No Urinary Incontinence or Vaginal Discharge +discolored urine this AM  Musculoskeletal ROS: No Arthralgia, Arthritis,Gout,Osteoporosis or Rheumatism  Neurological ROS: No CVA, Migraines, Epilepsy, Seizure Hx, or Limb Weakness  Dermatological ROS: No Rash, Itching, Hives, Mole Changes or Cancer   Obstetrical: +contractions, +pelvic pressure, decreased fetal movement    Physical Exam:  Vitals:    06/19/22 0513   BP: (!) 119/59   Pulse: (!) 105   Resp: 16   Temp: 98.8 °F (37.1 °C)   TempSrc: Oral       Fetal heart rate:  Baseline Heart Rate 130 bpm.  Category 1 Tracing with mod variability    General appearance:  awake, alert, cooperative, no apparent distress, and appears stated age  Neurologic:  CN 2-12 GI  Lungs: CTAB No RRW   Heart:  RRR   Abdomen:  Gravid not tender No GRR No CVAT BL  Pelvic Exam:  Cervix Check: per RN check, will recheck to rule out labor    Membranes Ruptured: No    Contraction frequency:  4-6 minutes regular      Limited ultrasound: to be done before disharge    Lab Results:   Admission on 06/19/2022   Component Date Value Ref Range Status    Color, UA 06/19/2022 Yellow  Yellow Final    Turbidity UA 06/19/2022 Cloudy* Clear Final    Glucose, Ur 06/19/2022 NEGATIVE  NEGATIVE Final    Bilirubin Urine 06/19/2022 NEGATIVE  NEGATIVE Final    Ketones, Urine 06/19/2022 NEGATIVE  NEGATIVE Final    Specific Home, UA 06/19/2022 1.011  1.000 - 1.030 Final    Urine Hgb 06/19/2022 NEGATIVE  NEGATIVE Final    pH, UA 06/19/2022 7.0  5.0 - 8.0 Final    Protein, UA 06/19/2022 NEGATIVE  NEGATIVE Final    Urobilinogen, Urine 06/19/2022 Normal  Normal Final    Nitrite, Urine 06/19/2022 NEGATIVE  NEGATIVE Final    Leukocyte Esterase, Urine 06/19/2022 MOD* NEGATIVE Final    WBC, UA 06/19/2022 21 TO 50  /HPF Final    RBC, UA 06/19/2022 0 TO 2  /HPF Final    Casts UA 06/19/2022 0 TO 2  /LPF Final    Epithelial Cells UA 06/19/2022 21 TO 50  /HPF Final    Bacteria, UA 06/19/2022 FEW* None Final   Hospital Outpatient Visit on 04/18/2022   Component Date Value Ref Range Status    GLU ADMN 04/18/2022 Glucola   Final    Glucose tolerance screen 50g 04/18/2022 100  70 - 135 mg/dL Final    WBC 04/18/2022 10.5  4.5 - 13.5 k/uL Final    RBC 04/18/2022 4.16  4.0 - 5.2 m/uL Final    Hemoglobin 04/18/2022 12.2  12.0 - 16.0 g/dL Final    Hematocrit 04/18/2022 36.8  36 - 46 % Final    MCV 04/18/2022 88.6  80 - 100 fL Final    MCH 04/18/2022 29.4  26 - 34 pg Final    MCHC 04/18/2022 33.2  31 - 37 g/dL Final    RDW 04/18/2022 13.2  11.5 - 14.9 % Final    Platelets 54/93/3981 249  150 - 450 k/uL Final    MPV 04/18/2022 8.2  6.0 - 12.0 fL Final    Seg Neutrophils 04/18/2022 77* 34 - 64 % Final    Lymphocytes 04/18/2022 16* 25 - 45 % Final    Monocytes 04/18/2022 6  2 - 8 % Final    Eosinophils % 04/18/2022 1  0 - 4 % Final    Basophils 04/18/2022 0  0 - 2 % Final    Segs Absolute 04/18/2022 8.10  1.3 - 9.1 k/uL Final    Absolute Lymph # 04/18/2022 1.60  1.0 - 4.8 k/uL Final    Absolute Mono # 04/18/2022 0.70  0.1 - 1.3 k/uL Final    Absolute Eos # 04/18/2022 0.10  0.0 - 0.4 k/uL Final    Basophils Absolute 04/18/2022 0.00  0.0 - 0.2 k/uL Final    Color, UA 04/18/2022 Yellow  Yellow Final    Turbidity UA 04/18/2022 Clear  Clear Final    Glucose, Ur 04/18/2022 NEGATIVE  NEGATIVE Final    Bilirubin Urine 04/18/2022 NEGATIVE  NEGATIVE Final    Ketones, Urine 04/18/2022 NEGATIVE  NEGATIVE Final    Specific Gravity, UA 04/18/2022 1.017  1.000 - 1.030 Final    Urine Hgb 04/18/2022 NEGATIVE  NEGATIVE Final    pH, UA 04/18/2022 8.0  5.0 - 8.0 Final    Protein, UA 04/18/2022 NEGATIVE  NEGATIVE Final    Urobilinogen, Urine 04/18/2022 Normal  Normal Final    Nitrite, Urine 04/18/2022 NEGATIVE  NEGATIVE Final    Leukocyte Esterase, Urine 04/18/2022 NEGATIVE  NEGATIVE Final    Urinalysis Comments 04/18/2022 Microscopic exam not performed based on chemical results unless requested in original order.    Final   Hospital Outpatient Visit on 02/18/2022   Component Date Value Ref Range Status    Date of Birth 02/18/2022 6,262,000   Final    Maternal Weight 02/18/2022 161   Final    Patient Weight Units 02/18/2022 LBS   Final    Est Due Date 02/18/2022 [id number]   Final    Dating Method 02/18/2022 36,661,796   Final    Last Menstrual Period 02/18/2022 18,494,984   Final    Monochorionic Twins 02/18/2022 NO   Final    Race (Maternal) 02/18/2022    Final    Diabetic 02/18/2022 NO   Final    Smoking 02/18/2022 NO   Final    Valproic/Carbamazep 02/18/2022 NO   Final    Prev Trisomy Preg 02/18/2022 NO   Final    Fam HX NTD 02/18/2022 NO   Final    In Vitro Fertilization 02/18/2022 NO   Final    Donor Egg? 02/18/2022 NO   Final    Repeat Specimen? 02/18/2022 NO   Final    AFP (Alpha Fetoprotein) 02/18/2022 52  ng/mL Final    AFP Mom 02/18/2022 0.84   Final    uE3 Value 02/18/2022 2.19  ng/mL Final    uE3 MoM 02/18/2022 0.89   Final    Patient's HCG, Tri 2 02/18/2022 11,961  IU/L Final    Mom For HCG, 2nd Trimester 02/18/2022 0.59   Final    Dimeric Inhibin A 02/18/2022 132  pg/mL Final    Inhibin A MoM 02/18/2022 0.81   Final    AFP Interp 02/18/2022 Screen Neg   Final    Comment: (NOTE)  INTERPRETATION: SCREEN NEGATIVE                               Neural Tube Defects (NTD)   Negative       Down syndrome (DS)          Negative       Trisomy 18 (T18)            Negative                                                               Pre-Test     Post-Test    Cutoff                                      Neural Tube Defects Risks   1:1030       < 1:24615    1:250 Down Syndrome Risks         1:1120       < 1:90189    1:150          Trisomy 18 Risks            1:4360       < 1:38634    1:100                                       Comments: The risk of an open neural tube defect is less than the  screening cut-off. The risk of Down syndrome is less than the screening  cut-off. The risk of trisomy 25 is less than the screening cut-off. This test was developed and its performance characteristics   determined by Erin langford. It has not been cleared or   approved by the [employer] and Drug Administration. This test was   performed in a CLIA certified laboratory and is intended for   clinical purposes.  Maternal Age At [facility] 2/18/2022 22[age]0  yr Final    Patient Weight 02/18/2022 161.0 lbs. Final    Due Date 02/18/2022 SEE NOTE   Final    Comment: Results for Estimated Due Date: 07 04 22       Gestational Age 02/18/2022 20 wks, 4 days   Final    Determined by 02/18/2022 Other   Final    Number of Fetuses 02/18/2022 Freda Raman   Final    Race 02/18/2022 Unknown   Final    Insulin Req Diabetes 02/18/2022 No   Final    Smoking 02/18/2022 No   Final    History 02/18/2022 No   Final    History of Aneuploidy? 02/18/2022 No   Final    AFP Specimen 02/18/2022 See Note   Final    Initial sample    AFP Quad Interp 02/18/2022 See Note   Final    Comment: (NOTE)  Access [employer] Enhanced Report using the link below:    -Direct access: [email or web address]  Performed By: [facility]  [place], [address] [facility]  : [place].  Pat Lovejoy, [address] [place] [address] Outpatient Visit on 01/06/2022   Component Date Value Ref Range Status    GLU ADMN 01/06/2022 Glucola   Final    Glucose tolerance screen 50g 01/06/2022 95  70 - 135 mg/dL Final    Cystic Fibrosis 01/05/2022    Final                    Value:Specimen(s) Received:  Peripheral blood, CF  Clinical Information:  Patient History Unknown  Prenatal Screening for Cystic Fibrosis    RESULTS:  ** This patient is negative for all CF mutations analyzed **  Due to the complex nature of this testing, genetic counseling is  recommended  This interpretation is based on the assumption that this individual  has a negative personal and family history for cystic fibrosis    INTERPRETATION:    Using the methods described, this patient tested  negative for all 60 mutations screened, including those recommended by  the [facility] of Obstetricians and Gynecologists and the  [employer]. These results do not rule out  the possibility that this individual could carry a CF mutation not  detected by this test.  The patient should understand that DNA studies  do not constitute a definitive carrier test for cystic fibrosis in all  individuals. Thus, interpretation is given as a probability (see  below). Accurate risk c                          alculation requires accurate family history  information. Inaccurate reporting of a family history of cystic  fibrosis will lead to errors in residual risk assessment. It should  be realized that there are many sources of diagnostic error in  molecular testing which may include trace contamination of PCR  reactions, rare genetic variants that can interfere with the analysis,  or general laboratory error.      Cystic Fibrosis Carrier Rate by Racial and Ethnic Group, Before and  After Screening  [from Kashif et al (2001):  Jami in Med 3(2), 149-154]                                  Estimated Carrier Risk          Ethnic Group               Detection Rate                     Before Test          After Negative Test       Ashkenazi Jain                    97%               1/29 1/930                       90%               1/29 1/240                      69%               1/65 1/207       * American                                          57%               1/46 1/105   American                    No data available          1/90           No data available     *Additional information required to accurately predict risk  Note:  Residual carrier risk after negative test is modified by  presence of positive family history of CF or by admixture of ethnic  groups. For these situations, accurate risk assessment requires Bayesian  analysis and genetic counseling    This molecular test has been approved for in vitro diagnostic use by  the U.S. FDA. This test is used for clinical purposes. Pursuant to  the requirements of CLIA '88, this laboratory has established and  verified the test's accuracy and precision. It should not be regarded  as investigational or for research. This laboratory is certified  under the [address] (CLIA  '88) as qualified to perform high complexity clinical laboratory  testing. Methodology:  Samples are tested for the prese                          nce of wild type or  mutant gene sequences in the CFTR gene by the [employer] Cystic  Fibrosis 60 Kit. It is comprised of a single multiplex PCR reaction  that is then used in two separate Allele Specific Primer Extension  reactions, which are followed by two separate bead hybridization  reactions.   The [employer] Assay screens for 60 CFTR mutations,  including the standard 23 mutation core panel recommended by the  [employer] of Obstetricians and Gynecologists (ACOG) and the  [employer] ([facility]):  [id number], [id number], G542X,  G85E, R117H, 621+1G>T, 711+1G>T, U7207A, R334W, R347P, A455E,  1717-1G>A, R560T, R553X, G551D, 1898+1G>A, 2184delA, 2789+5G>A,  3120+1G>A, W4783I, 3659delC, 3849+10kbC>T, I4700B, 1078delT, 394delTT,  Y122X, R347H, Arva Joaquin, 2183AA>G,  [id number], Q7588862, F9069147, X967387, R3270439, 3905insT, CFTRdele2,3,  E60X, R75X, 406-1G>A, G178R, [id number], L206W, 935delA, G330X, Q493X,  Q890X, 1677delTA, 7530srh9>A, 2143delT, K                          710X, H7558C, 6215ddr8,  L9032N, [id number], C9406K, 3791delC and variants 5T/7T/9T, F508C, I507V,  I506V. For samples positive for R117H, the IVS-8 Poly T variant is  analyzed. **Electronically Signed Out**          Mauri Aguilar M.D.         [address], [address], [address]   Tel [phone]  [employer] for Laquita Berumen MD,   Calvin Mendes.  [address] [place] [address] Outpatient Visit on 01/05/2022   Component Date Value Ref Range Status    Rubella Antibody, IGG 01/05/2022 299.2  IU/mL Final    Comment:             REFERENCE RANGE:  <5.0       NON-REACTIVE (non-immune)  5.0 TO 9.9 EQUIVOCAL  >=10.0     REACTIVE     (immune)            ABO/Rh 01/05/2022 B POSITIVE   Final    Antibody Screen 01/05/2022 NEGATIVE   Final    WBC 01/05/2022 8.0  4.5 - 13.5 k/uL Final    RBC 01/05/2022 4.22  4.0 - 5.2 m/uL Final    Hemoglobin 01/05/2022 13.0  12.0 - 16.0 g/dL Final    Hematocrit 01/05/2022 38.0  36 - 46 % Final    MCV 01/05/2022 90.1  80 - 100 fL Final    MCH 01/05/2022 30.7  26 - 34 pg Final    MCHC 01/05/2022 34.1  31 - 37 g/dL Final    RDW 01/05/2022 12.6  11.5 - 14.9 % Final    Platelets 09/77/6559 261  150 - 450 k/uL Final    MPV 01/05/2022 7.9  6.0 - 12.0 fL Final    NRBC Automated 01/05/2022 NOT REPORTED  per 100 WBC Final    Differential Type 01/05/2022 NOT REPORTED   Final    Seg Neutrophils 01/05/2022 74* 34 - 64 % Final    Lymphocytes 01/05/2022 21* 25 - 45 % Final    Monocytes 01/05/2022 5  2 - 8 % Final    Eosinophils % 01/05/2022 0  0 - 4 % Final    Basophils 01/05/2022 0  0 - 2 % Final    Immature Granulocytes 01/05/2022 NOT REPORTED  0 % Final    Segs Absolute 01/05/2022 5.90  1.3 - 9.1 k/uL Final    Absolute Lymph # 01/05/2022 1.70  1.0 - 4.8 k/uL Final    Absolute Mono # 01/05/2022 0.40  0.1 - 1.3 k/uL Final    Absolute Eos # 01/05/2022 0.00  0.0 - 0.4 k/uL Final    Basophils Absolute 01/05/2022 0.00  0.0 - 0.2 k/uL Final    Absolute Immature Granulocyte 01/05/2022 NOT REPORTED  0.00 - 0.30 k/uL Final    WBC Morphology 01/05/2022 NOT REPORTED   Final    RBC Morphology 01/05/2022 NOT REPORTED   Final    Platelet Estimate 75/82/9006 NOT REPORTED   Final    TSH 01/05/2022 1.19  0.30 - 5.00 mIU/L Final    Hepatitis B Surface Ag 01/05/2022 NONREACTIVE  NONREACTIVE Final    HIV Ag/Ab 01/05/2022 NONREACTIVE  NONREACTIVE Final    Comment: No laboratory evidence of HIV infection. If acute HIV infection is suspected, consider   testing for HIV-1 RNA.  Toxoplasma IgG 01/05/2022 <0.5  IU/mL Final    Comment:             REFERENCE RANGE:  <6.3             NON-REACTIVE  6.4 TO 9.9       EQUIVOCAL  >=10.0           REACTIVE        THE PRESENCE OF TOXOPLASMA GONDII IgG ANTIBODIES IS INDICATIVE OF EXPOSURE TO THE PROTOZOAN. THE ABSENCE OF TOXOPLASMA IgG ANTIBODIES SUGGESTS THAT THE PATIENT HAS NOT BEEN EXPOSED TO   THIS ORGANISM AND IS SUSCEPTIBLE TO PRIMARY INFECTION. DETERMINATION OF PRIMARY OR RECENT   INFECTION REQUIRES DEMONSTRATING SEROCONVERSION BETWEEN ACUTE AND CONVALESCENT SERA.       Toxoplasm IgM 01/05/2022 0.44  Index Final    Comment:             REFERENCE RANGE:  <0.90            NON-REACTIVE  0.90 TO  0.99    INDETERMINANT  >=1.00           REACTIVE            Color, UA 01/05/2022 Yellow  Yellow Final    Turbidity UA 01/05/2022 Clear  Clear Final    Glucose, Ur 01/05/2022 NEGATIVE  NEGATIVE Final    Bilirubin Urine 01/05/2022 NEGATIVE  NEGATIVE Final    Ketones, Urine 01/05/2022 NEGATIVE  NEGATIVE Final    Specific Gravity, UA 01/05/2022 1.023  1.000 - 1.030 Final    Urine Hgb 2022 NEGATIVE  NEGATIVE Final    pH, UA 2022 6.5  5.0 - 8.0 Final    Protein, UA 2022 NEGATIVE  NEGATIVE Final    Urobilinogen, Urine 2022 Normal  Normal Final    Nitrite, Urine 2022 NEGATIVE  NEGATIVE Final    Leukocyte Esterase, Urine 2022 NEGATIVE  NEGATIVE Final    Urinalysis Comments 2022 Microscopic exam not performed based on chemical results unless requested in original order. Final    T. pallidum, IgG 2022 NONREACTIVE  NONREACTIVE Final    Comment:       T. pallidum antibodies are not detected. There is no serological evidence of infection with T. pallidum (early primary syphilis   cannot be excluded). Retest in 2-4 weeks if syphilis is clinically suspect. Hospital Outpatient Visit on 2021   Component Date Value Ref Range Status    hCG Quant 2021 44,023* <5 IU/L Final    Comment:    Non-preg premeno   <=5  Postmeno           <=8  Male               <=3  If HCG results do not concur with clinical observations, additional testing to confirm   results is recommended. Elevated results not associated with pregnancy may be found in patients with other diseases   such as tumors of the germ cells (testis, ovaries, etc.), bladder, pancreas, stomach, lungs,   and liver.     }  See Result console    RHIG Screen: No results found for: RHIGS  Group B Strep:  No results found for: GBSCX      Assessment:  1. Arlyn Lazcano is a 24 y.o. female   At 41w10d    2.   OB History        1    Para        Term                AB        Living           SAB        IAB        Ectopic        Molar        Multiple        Live Births                    3. Contractions, rule out labor       4.    Past Medical History:   Diagnosis Date    Anemia     Atopic dermatitis 10/23/2011    Depression     anxiety    Multiple fractures 10/23/2011    Otitis media, left 1/10/2012    Sports physical 10/17/2016    Sprain of interphalangeal joint of right thumb 12/4/2021    Vasovagal near syncope 3/28/2017           5. Patient Active Problem List    Diagnosis Date Noted    Low-lying placenta 02/14/2022     Priority: High     Pelvic rest/ no heavy lifting       Primigravida, antepartum 01/05/2022     Priority: High    Pelvic pressure in pregnancy 06/19/2022     Priority: Medium    Antepartum placenta circumvallata 02/14/2022       6. GBS negative    Plan:  Admit  Fetal monitoring-continuous  Notify on call attending of any category 2 tracing  See labs and orders  Recheck rule out labor  Cat 1 FHT, BPP pending    Risks, benefits, alternatives and possible complications have been discussed in detail with the patient. Admission, and post admission procedures and expectations were discussed in detail. All questions were answered. Discussion of both the risks benefits and alternative options were discussed as well as the potential maternal and fetal morbidity risks.      See orders          Attending's Name: Troy Martino DO          Electronically signed by Troy Martino DO on 6/19/2022 at 7:57 AM

## 2022-06-20 ENCOUNTER — HOSPITAL ENCOUNTER (OUTPATIENT)
Age: 22
Setting detail: OBSERVATION
Discharge: HOME | End: 2022-06-20
Attending: OBSTETRICS & GYNECOLOGY | Admitting: OBSTETRICS & GYNECOLOGY
Payer: COMMERCIAL

## 2022-06-20 ENCOUNTER — ROUTINE PRENATAL (OUTPATIENT)
Dept: PERINATAL CARE | Age: 22
End: 2022-06-20
Payer: COMMERCIAL

## 2022-06-20 VITALS
WEIGHT: 225 LBS | RESPIRATION RATE: 18 BRPM | SYSTOLIC BLOOD PRESSURE: 136 MMHG | HEIGHT: 66 IN | DIASTOLIC BLOOD PRESSURE: 80 MMHG | HEART RATE: 99 BPM | TEMPERATURE: 97.2 F | BODY MASS INDEX: 36.16 KG/M2

## 2022-06-20 VITALS
DIASTOLIC BLOOD PRESSURE: 74 MMHG | SYSTOLIC BLOOD PRESSURE: 132 MMHG | RESPIRATION RATE: 18 BRPM | OXYGEN SATURATION: 97 % | HEART RATE: 97 BPM | TEMPERATURE: 97.2 F

## 2022-06-20 DIAGNOSIS — O43.113 CIRCUMVALLATE PLACENTA IN THIRD TRIMESTER: ICD-10-CM

## 2022-06-20 DIAGNOSIS — Z13.89 ENCOUNTER FOR ROUTINE SCREENING FOR MALFORMATION USING ULTRASONICS: ICD-10-CM

## 2022-06-20 DIAGNOSIS — O16.3 ELEVATED BLOOD PRESSURE COMPLICATING PREGNANCY IN THIRD TRIMESTER, ANTEPARTUM: Primary | ICD-10-CM

## 2022-06-20 DIAGNOSIS — Z3A.38 38 WEEKS GESTATION OF PREGNANCY: ICD-10-CM

## 2022-06-20 DIAGNOSIS — Z36.4 ANTENATAL SCREENING FOR FETAL GROWTH RETARDATION USING ULTRASONICS: ICD-10-CM

## 2022-06-20 DIAGNOSIS — O99.213 OBESITY AFFECTING PREGNANCY IN THIRD TRIMESTER: ICD-10-CM

## 2022-06-20 DIAGNOSIS — O26.03 EXCESSIVE WEIGHT GAIN DURING PREGNANCY IN THIRD TRIMESTER: ICD-10-CM

## 2022-06-20 PROBLEM — O09.93 HIGH-RISK PREGNANCY IN THIRD TRIMESTER: Status: ACTIVE | Noted: 2022-06-20

## 2022-06-20 LAB
ABDOMINAL CIRCUMFERENCE: NORMAL
ABSOLUTE EOS #: 0.07 K/UL (ref 0–0.44)
ABSOLUTE IMMATURE GRANULOCYTE: 0.04 K/UL (ref 0–0.3)
ABSOLUTE LYMPH #: 1.74 K/UL (ref 1.1–3.7)
ABSOLUTE MONO #: 0.82 K/UL (ref 0.1–1.4)
ALBUMIN SERPL-MCNC: 3.2 G/DL (ref 3.5–5.2)
ALBUMIN/GLOBULIN RATIO: 1 (ref 1–2.5)
ALP BLD-CCNC: 135 U/L (ref 35–104)
ALT SERPL-CCNC: 9 U/L (ref 5–33)
ANION GAP SERPL CALCULATED.3IONS-SCNC: 9 MMOL/L (ref 9–17)
AST SERPL-CCNC: 14 U/L
BASOPHILS # BLD: 0 % (ref 0–2)
BASOPHILS ABSOLUTE: 0.03 K/UL (ref 0–0.2)
BILIRUB SERPL-MCNC: 0.23 MG/DL (ref 0.3–1.2)
BIPARIETAL DIAMETER: NORMAL
BUN BLDV-MCNC: 7 MG/DL (ref 6–20)
CALCIUM SERPL-MCNC: 8.6 MG/DL (ref 8.6–10.4)
CHLORIDE BLD-SCNC: 103 MMOL/L (ref 98–107)
CO2: 20 MMOL/L (ref 20–31)
CREAT SERPL-MCNC: 0.38 MG/DL (ref 0.5–0.9)
CREATININE URINE: 124.8 MG/DL (ref 28–217)
CULTURE: NORMAL
EOSINOPHILS RELATIVE PERCENT: 1 % (ref 1–4)
ESTIMATED FETAL WEIGHT: NORMAL
FEMORAL DIAMETER: NORMAL
GFR AFRICAN AMERICAN: >60 ML/MIN
GFR NON-AFRICAN AMERICAN: >60 ML/MIN
GFR SERPL CREATININE-BSD FRML MDRD: ABNORMAL ML/MIN/{1.73_M2}
GLUCOSE BLD-MCNC: 81 MG/DL (ref 70–99)
HC/AC: NORMAL
HCT VFR BLD CALC: 35.6 % (ref 36.3–47.1)
HEAD CIRCUMFERENCE: NORMAL
HEMOGLOBIN: 11.2 G/DL (ref 11.9–15.1)
IMMATURE GRANULOCYTES: 0 %
LYMPHOCYTES # BLD: 17 % (ref 25–45)
MCH RBC QN AUTO: 27.1 PG (ref 25.2–33.5)
MCHC RBC AUTO-ENTMCNC: 31.5 G/DL (ref 28.4–34.8)
MCV RBC AUTO: 86 FL (ref 82.6–102.9)
MONOCYTES # BLD: 8 % (ref 2–8)
NRBC AUTOMATED: 0 PER 100 WBC
PDW BLD-RTO: 13.3 % (ref 11.8–14.4)
PLATELET # BLD: 192 K/UL (ref 138–453)
PMV BLD AUTO: 11.5 FL (ref 8.1–13.5)
POTASSIUM SERPL-SCNC: 4.1 MMOL/L (ref 3.7–5.3)
RBC # BLD: 4.14 M/UL (ref 3.95–5.11)
SEG NEUTROPHILS: 74 % (ref 34–64)
SEGMENTED NEUTROPHILS ABSOLUTE COUNT: 7.61 K/UL (ref 1.5–8.1)
SODIUM BLD-SCNC: 132 MMOL/L (ref 135–144)
SPECIMEN DESCRIPTION: NORMAL
TOTAL PROTEIN, URINE: 16 MG/DL
TOTAL PROTEIN: 6.3 G/DL (ref 6.4–8.3)
URINE TOTAL PROTEIN CREATININE RATIO: 0.13 (ref 0–0.2)
WBC # BLD: 10.3 K/UL (ref 4.5–13.5)

## 2022-06-20 PROCEDURE — 82570 ASSAY OF URINE CREATININE: CPT

## 2022-06-20 PROCEDURE — 76820 UMBILICAL ARTERY ECHO: CPT | Performed by: OBSTETRICS & GYNECOLOGY

## 2022-06-20 PROCEDURE — G0378 HOSPITAL OBSERVATION PER HR: HCPCS

## 2022-06-20 PROCEDURE — 99213 OFFICE O/P EST LOW 20 MIN: CPT

## 2022-06-20 PROCEDURE — 99999 PR OFFICE/OUTPT VISIT,PROCEDURE ONLY: CPT | Performed by: OBSTETRICS & GYNECOLOGY

## 2022-06-20 PROCEDURE — 85025 COMPLETE CBC W/AUTO DIFF WBC: CPT

## 2022-06-20 PROCEDURE — 76816 OB US FOLLOW-UP PER FETUS: CPT | Performed by: OBSTETRICS & GYNECOLOGY

## 2022-06-20 PROCEDURE — 76819 FETAL BIOPHYS PROFIL W/O NST: CPT | Performed by: OBSTETRICS & GYNECOLOGY

## 2022-06-20 PROCEDURE — 84156 ASSAY OF PROTEIN URINE: CPT

## 2022-06-20 PROCEDURE — 80053 COMPREHEN METABOLIC PANEL: CPT

## 2022-06-20 NOTE — PROGRESS NOTES
Patient to L&D from [facility]. Patient to room triage 2 and provided with gown. Patient to restroom.

## 2022-06-20 NOTE — H&P
OBSTETRICAL HISTORY [facility]    Date: 2022       Time: 9:47 AM   Patient Name: Mae Figueredo     Patient : 2000  Room/Bed: [place] [address]    Admission Date/Time: 2022  9:41 AM      CC: Rule out Pre E/gHTN     HPI: Mae Figueredo is a 24 y.o.  at 38w0d who presents from [facility] office for rule out gHTN/Pre E. Patient presented to a scheduled [facility] scan for circumvallate placenta. She had an elevated bp of 154/88 on arrival with normotensive repeat. Patient denies s/s of pre E. The patient reports fetal movement is present, denies contractions, denies loss of fluid, denies vaginal bleeding. She denies HA, vision changes, SOB, chest pain, Lightheadedness, dizziness, nausea, vomiting. DATING:  LMP: No LMP recorded (approximate). Patient is pregnant.   Estimated Date of Delivery: 22   Based on: early ultrasound, at 6 0/7 weeks GA    PREGNANCY RISK FACTORS:  Patient Active Problem List   Diagnosis    Antepartum placenta circumvallata    Low-lying placenta        Steroids Given In This Pregnancy:  no     REVIEW OF SYSTEMS:   Constitutional: negative fever, negative chills, negative weight changes   HEENT: negative visual disturbances, negative headaches, negative dizziness, negative hearing loss  Breast: Negative breast abnormalities, negative breast lumps, negative nipple discharge  Respiratory: negative dyspnea, negative cough, negative SOB  Cardiovascular: negative chest pain,  negative palpitations, negative arrhythmia, negative syncope   Gastrointestinal: negative abdominal pain, negative RUQ pain, negative N/V, negative diarrhea, negative constipation, negative bowel changes, negative heartburn   Genitourinary: negative dysuria, negative hematuria, negative urinary incontinence, negative vaginal discharge, negative vaginal bleeding or spotting  Dermatological: negative rash, negative pruritis, negative mole or other skin changes  Hematologic: negative bruising  Immunologic/Lymphatic: negative recent illness, negative recent sick contact  Musculoskeletal: negative back pain, negative myalgias, negative arthralgias  Neurological:  negative dizziness, negative migraines, negative seizures, negative weakness  Behavior/Psych: negative depression, negative anxiety, negative SI, negative HI    OBSTETRICAL HISTORY:   OB History    Para Term  AB Living   1 0 0 0 0 0   SAB IAB Ectopic Molar Multiple Live Births   0 0 0 0 0 0      # Outcome Date GA Lbr Daniel/2nd Weight Sex Delivery Anes PTL Lv   1 Current                PAST MEDICAL HISTORY:   has a past medical history of Anemia, Atopic dermatitis, Depression, Multiple fractures, Otitis media, left, Sports physical, Sprain of interphalangeal joint of right thumb, and Vasovagal near syncope. PAST SURGICAL HISTORY:   has no past surgical history on file. ALLERGIES:  has No Known Allergies. MEDICATIONS:  Prior to Admission medications    Medication Sig Start Date End Date Taking? Authorizing Provider   famotidine (PEPCID) 20 MG tablet Take 1 tablet by mouth 2 times daily 22   Jodi Charles DO   Prenatal Vit-Fe Fumarate-FA (PRENATAL VITAMINS PO) Take 1 tablet by mouth daily 21   Historical Provider, MD       FAMILY HISTORY:  family history includes Arthritis in her maternal grandmother; Asthma in her maternal grandmother and sister; Bipolar Disorder in her maternal aunt; Cancer in her paternal grandfather; Diabetes in her maternal grandfather, mother, and paternal grandfather; Heart Disease in her maternal grandfather, mother, and paternal grandfather; Hypertension in her father and mother; Irritable Bowel Syndrome in her mother; Rheum Arthritis in her mother; Stroke in her paternal grandmother. SOCIAL HISTORY:   reports that she is a non-smoker but has been exposed to tobacco smoke. She has never used smokeless tobacco. She reports previous alcohol use.  She reports previous drug use. VITALS:  Vitals:    06/20/22 1007 06/20/22 1010   BP: 130/74 126/84   Pulse: (!) 102 99   Resp: 18    Temp: 97.2 °F (36.2 °C)    TempSrc: Oral          PHYSICAL EXAM:  Fetal Heart Monitor:  Baseline Heart Rate 150, moderate variability, present accelerations, absent decelerations  [place]: contractions, irregular    General appearance:  no apparent distress alert and cooperative  HEENT: head atraumatic, normocephalic, moist mucous membranes, trachea midline  Neurologic:  alert, oriented, normal speech, no focal findings or movement disorder noted  Lungs:  No increased work of breathing, good air exchange, clear to auscultation bilaterally, no crackles or wheezing  Heart:  regular rate and rhythm and no murmur, rubs, gallops  Abdomen:  soft, gravid, non-tender, no rebound, guarding, or rigidity, no RUQ or epigastric tenderness, no signs or symptoms of abruption, no signs or symptoms of chorioamnionitis  Extremities:  no calf tenderness, non edematous, no varicosities, full range of motion in all four extremities  Musculoskeletal: Gross strength equal and intact throughout, no gross abnormalities, range of motion normal in hips, knees, shoulders and spine, CVA tenderness: none  Psychiatric: Mood appropriate, normal affect   Rectal Exam: not indicated  Pelvic Exam: not indicated     PRENATAL LAB RESULTS:   Blood Type/Rh: B pos  Antibody Screen: negative  Hemoglobin, Hematocrit, Platelets: 74/28/972  Rubella: immune  T.  Pallidum, IgG: non-reactive  Hepatitis B Surface Antigen: non-reactive   Hepatitis C Antibody: not done  HIV: non-reactive   Sickle Cell Screen: not done  Gonorrhea: not done  Chlamydia: not done  Urine culture: negative, date: 6/19/22    Early 1 hour Glucose Tolerance Test: 95  Early 3 hour Glucose Tolerance Test: not done  1 hour Glucose Tolerance Test: 100  3 hour Glucose Tolerance Test: not done    Group B Strep: negative  Cystic Fibrosis Screen: negative  First Trimester Screen: low risk for aneuploidy  MSAFP/Multiple Markers: not done  Non-Invasive Prenatal Testing: not done  Quad Screen: Low risk  Anatomy US: anterior left lateral placenta, 3VC, male gender, normal anatomy    ASSESSMENT & PLAN:  Pako Teixeira is a 24 y.o. female  at 42w0d    - GBS negative / Rh positive / R immune   - No indication for GBS prophylaxis     Elevated BP x 1     - Patient presents from [facility] after 154/88 in the office   - Repeat was normotensive   - Denies s/s of pre E at this time   - BP normotensive on admission   - If patient has another elevated four hours after initial (1211 or later) she will meet criteria for gHTN and delivery is indicated   - CBC, CMP, P/C ordered   - BP q 30 minutes   - cEFM/TOCO- Cat 1, TOCO irregular contractions   - Continue to monitor    Circumvallate Placenta   - Follows with MFM   - Scan today with  BPP    Low Lying Placenta (resolved)   - Not noted on 22 MFM Scan    BMI 36    Patient Active Problem List    Diagnosis Date Noted    Low-lying placenta 2022     Priority: High     Pelvic rest/ no heavy lifting       Antepartum placenta circumvallata 2022       Plan discussed with Dr. Juan Diego Bowser, who is agreeable. Steroids given this admission: No    Risks, benefits, alternatives and possible complications have been discussed in detail with the patient. Admission, and post admission procedures and expectations were discussed in detail. All questions were answered.     Attending's Name: Dr. Marcy Clements DO  Ob/Gyn Resident  2022, 9:47 AM

## 2022-06-20 NOTE — PROGRESS NOTES
Patient didn't give urine at the time of vitals. Abdomen soft, nontender, nondistended, bowel sounds present in all 4 quadrants.

## 2022-06-20 NOTE — PROGRESS NOTES
Patient here d/t elevated BP in mfm for Pre-e workup. EFM applied, . BP taken in both arms. 130/74 in left arm, . 126/84 In right arm, HR 99. Patient denies any vision changes, headache, swelling, shortness of breath, chest pain, N/V, abdominal pain. She denies contractions, leaking of fluid, vaginal bleeding. She reports good fetal movement. Instructed patient about fetal monitoring and plan of care.

## 2022-06-20 NOTE — PROGRESS NOTES
Obstetric/Gynecology Maternal Fetal Medicine Resident Note    Patient here for scheduled MFM scan. Her BP on arrival was elevated at 154/88 with repeat normotensive. She denies s/s of Pre E. Patient recommended to go to labor and delivery for gHTN rule out and delivery if indicated.     Dr. Nereida Ibarra, OB residents and L&D notified    Keenan Elder DO  OBGYN Resident, PGY2  [facility]  6/20/2022, 9:28 AM

## 2022-06-20 NOTE — PROGRESS NOTES
OB/GYN Resident Interval Note    Preeclampsia labs wnl, P/C 0.13. Blood pressures normotensive. Patient does not meet criteria for a hypertensive disorder of pregnancy at this time. Patient denies s/s preeclampsia. Patient scheduled for IOL 6/26/22 to begin at midnight. Patient may be discharged to home. Patient counseled on adequate PO hydration and fetal kick counts. All questions and concerns addressed. Patient is aware that she should return to the hospital if she has worsening contractions, LOF, VB or decreased fetal movements. She voices understanding. Patient is aware that she should return to hospital if she experiences unrelenting headache, vision changes, RUQ pain, nausea, vomiting, and peripheral edema. She voices understanding.        Adam Graf, DO  OB/GYN Resident

## 2022-06-21 ENCOUNTER — ROUTINE PRENATAL (OUTPATIENT)
Dept: OBGYN CLINIC | Age: 22
End: 2022-06-21

## 2022-06-21 VITALS
BODY MASS INDEX: 36.8 KG/M2 | SYSTOLIC BLOOD PRESSURE: 104 MMHG | WEIGHT: 228 LBS | HEART RATE: 100 BPM | DIASTOLIC BLOOD PRESSURE: 68 MMHG

## 2022-06-21 DIAGNOSIS — O44.40 LOW-LYING PLACENTA: ICD-10-CM

## 2022-06-21 DIAGNOSIS — O43.119 ANTEPARTUM PLACENTA CIRCUMVALLATA: ICD-10-CM

## 2022-06-21 DIAGNOSIS — O09.93 HIGH-RISK PREGNANCY IN THIRD TRIMESTER: Primary | ICD-10-CM

## 2022-06-21 DIAGNOSIS — Z3A.38 38 WEEKS GESTATION OF PREGNANCY: ICD-10-CM

## 2022-06-21 PROCEDURE — 0502F SUBSEQUENT PRENATAL CARE: CPT | Performed by: NURSE PRACTITIONER

## 2022-06-21 NOTE — PROGRESS NOTES
Johanna Martines is a 24 y.o. female 43w4d        OB History    Para Term  AB Living   1             SAB IAB Ectopic Molar Multiple Live Births                    # Outcome Date GA Lbr Daniel/2nd Weight Sex Delivery Anes PTL Lv   1 Current                Vitals  BP: 104/68  Weight: 228 lb (103.4 kg)  Heart Rate: 100  Patient Position: Sitting  Albumin: Negative  Glucose: Negative      The patient was seen and evaluated. There was positive fetal movements. No contractions or leakage of fluid. Signs and symptoms of labor were reviewed. The S/S of Pre-Eclampsia were reviewed with the patient in detail. She is to report any of these if they occur. She currently denies any of these. The patient was instructed on fetal kick counts and was given a kick sheet to complete every 8 hours. She was instructed that the baby should move at a minimum of ten times within one hour after a meal. The patient was instructed to lay down on her left side twenty minutes after eating and count movements for up to one hour with a target value of ten movements. She was instructed to notify the office if she did not make that target after two attempts or if after any attempt there was less than four movements. The patient reports that the targets have been made Yes.    22 patient accepted and given tdap vaccine       T-Dap Vaccine Completed (27-36 weeks): Yes    Allergies: Allergies as of 2022    (No Known Allergies)         Group Beta Strep collection was completed.  Yes  GBS Results:   Admission on 2022, Discharged on 2022   Component Date Value Ref Range Status    WBC 2022 10.3  4.5 - 13.5 k/uL Final    RBC 2022 4.14  3.95 - 5.11 m/uL Final    Hemoglobin 2022 11.2* 11.9 - 15.1 g/dL Final    Hematocrit 2022 35.6* 36.3 - 47.1 % Final    MCV 2022 86.0  82.6 - 102.9 fL Final    MCH 2022 27.1  25.2 - 33.5 pg Final    MCHC 2022 31.5  28.4 - 34.8 g/dL Final    RDW 06/20/2022 13.3  11.8 - 14.4 % Final    Platelets 70/16/0976 192  138 - 453 k/uL Final    MPV 06/20/2022 11.5  8.1 - 13.5 fL Final    NRBC Automated 06/20/2022 0.0  0.0 per 100 WBC Final    Seg Neutrophils 06/20/2022 74* 34 - 64 % Final    Lymphocytes 06/20/2022 17* 25 - 45 % Final    Monocytes 06/20/2022 8  2 - 8 % Final    Eosinophils % 06/20/2022 1  1 - 4 % Final    Basophils 06/20/2022 0  0 - 2 % Final    Immature Granulocytes 06/20/2022 0  0 % Final    Segs Absolute 06/20/2022 7.61  1.50 - 8.10 k/uL Final    Absolute Lymph # 06/20/2022 1.74  1.10 - 3.70 k/uL Final    Absolute Mono # 06/20/2022 0.82  0.10 - 1.40 k/uL Final    Absolute Eos # 06/20/2022 0.07  0.00 - 0.44 k/uL Final    Basophils Absolute 06/20/2022 0.03  0.00 - 0.20 k/uL Final    Absolute Immature Granulocyte 06/20/2022 0.04  0.00 - 0.30 k/uL Final    Glucose 06/20/2022 81  70 - 99 mg/dL Final    BUN 06/20/2022 7  6 - 20 mg/dL Final    CREATININE 06/20/2022 0.38* 0.50 - 0.90 mg/dL Final    Calcium 06/20/2022 8.6  8.6 - 10.4 mg/dL Final    Sodium 06/20/2022 132* 135 - 144 mmol/L Final    Potassium 06/20/2022 4.1  3.7 - 5.3 mmol/L Final    Chloride 06/20/2022 103  98 - 107 mmol/L Final    CO2 06/20/2022 20  20 - 31 mmol/L Final    Anion Gap 06/20/2022 9  9 - 17 mmol/L Final    Alkaline Phosphatase 06/20/2022 135* 35 - 104 U/L Final    ALT 06/20/2022 9  5 - 33 U/L Final    AST 06/20/2022 14  <32 U/L Final    Total Bilirubin 06/20/2022 0.23* 0.3 - 1.2 mg/dL Final    Total Protein 06/20/2022 6.3* 6.4 - 8.3 g/dL Final    Albumin 06/20/2022 3.2* 3.5 - 5.2 g/dL Final    Albumin/Globulin Ratio 06/20/2022 1.0  1.0 - 2.5 Final    GFR Non- 06/20/2022 >60  >60 mL/min Final    GFR  06/20/2022 >60  >60 mL/min Final    GFR Comment 06/20/2022        Final    Comment: Average GFR for 2129 years old:   116 mL/min/1.73sq m  Chronic Kidney Disease:   <60 mL/min/1.73sq m  Kidney failure:   <15 mL/min/1.73sq m              eGFR calculated using average adult body mass. Additional eGFR calculator available at:        [email or web address]            Total Protein, Urine 2022 16  mg/dL Final    No normal range established.  Creatinine, Ur 2022 124.8  28.0 - 217.0 mg/dL Final    Urine Total Protein Creatinine Rat* 2022 0.13  0.00 - 0.20 Final   Admission on 2022, Discharged on 2022   Component Date Value Ref Range Status    Color, UA 2022 Yellow  Yellow Final    Turbidity UA 2022 Cloudy* Clear Final    Glucose, Ur 2022 NEGATIVE  NEGATIVE Final    Bilirubin Urine 2022 NEGATIVE  NEGATIVE Final    Ketones, Urine 2022 NEGATIVE  NEGATIVE Final    Specific [place], UA 2022 1.011  1.000 - 1.030 Final    Urine Hgb 2022 NEGATIVE  NEGATIVE Final    pH, UA 2022 7.0  5.0 - 8.0 Final    Protein, UA 2022 NEGATIVE  NEGATIVE Final    Urobilinogen, Urine 2022 Normal  Normal Final    Nitrite, Urine 2022 NEGATIVE  NEGATIVE Final    Leukocyte Esterase, Urine 2022 MOD* NEGATIVE Final    WBC, UA 2022 21 TO 50  /HPF Final    RBC, UA 2022 0 TO 2  /HPF Final    Casts UA 2022 0 TO 2  /LPF Final    Epithelial Cells UA 2022 21 TO 50  /HPF Final    Bacteria, UA 2022 FEW* None Final    Specimen Description 2022 . CLEAN CATCH URINE   Final    Culture 2022 NO SIGNIFICANT GROWTH   Final   ]        Cervical Exam was:   Declines cervical exam.    The literature regarding a questionable link to pitocin augmentation and induction of labor, the assistance of labor contractions and the initiation of contractions to help delivery, have been reviewed with the patient regarding the increased potential of having a  with Attention Deficit Hyperactivity Disorder and or Autism.  These two disorders and the ramifications of their impact on a child and the family caring for that child has been reviewed with the patient in detail. She was given the risks, benefits and alternatives of the use of this medication. She has agreed to its use in the delivery of her unborn child if needed at the time of delivery, Yes. The patient was counseled on the mandatory call ahead policy. She has been instructed to call the office at anytime prior to going into the hospital so the on-call physician may direct her to the appropriate facility for care. Exceptions were reviewed including but not limited to: Decreased fetal movement, vaginal Bleeding or hemorrhage, trauma, readily expectant delivery, or any instance where she feels 911 should be utilized. The patient was counseled on Labor & Delivery. yes  Route of delivery and counseling on vaginal, operative vaginal, and  sections were completed with the risks of each to both the patient as well as her baby. The possibility of a blood transfusion was discussed as well. The patient was not opposed to receiving a transfusion if needed. The patient was counseled on types of analgesia during labor and is considering either Regional or IV medication the risks, benefits and alternatives were discussed.  Testing:  NA        Assessment:  1. Randy Mejias is a 24 y.o. female  2.   3. 38w1d    Patient Active Problem List    Diagnosis Date Noted    Circumvallate Placenta 2022     Priority: High    Low-lying placenta (resolved) 2022     Priority: High     Overview Note:     Pelvic rest/ no heavy lifting       High-risk pregnancy in third trimester 2022     Priority: Medium    Elevated Bp x 1  2022     Overview Note:     22: 154/88 at [facility]  Pre E labs **, P/C **          Diagnosis Orders   1. High-risk pregnancy in third trimester     2. Low-lying placenta     3.  Antepartum placenta circumvallata     4. 38 weeks gestation of pregnancy             Plan:  The patient will return to the office for her next visit in 2 weeks for 2 week PP visit. See antepartum flow sheet. Declines cervical exam  Denies S/S of labor  + fetal movement. Scheduled for induction 6/26/2022 [place]    Patient was seen with total face to face time of 20 minutes. More than 50% of this visit was on counseling and education regarding her    Diagnosis Orders   1. High-risk pregnancy in third trimester     2. Low-lying placenta     3. Antepartum placenta circumvallata     4. 38 weeks gestation of pregnancy      and her options. She was also counseled on her preventative health maintenance recommendations and follow-up.

## 2022-06-26 ENCOUNTER — HOSPITAL ENCOUNTER (INPATIENT)
Age: 22
LOS: 4 days | Discharge: HOME | End: 2022-06-30
Attending: OBSTETRICS & GYNECOLOGY | Admitting: OBSTETRICS & GYNECOLOGY
Payer: COMMERCIAL

## 2022-06-26 ENCOUNTER — APPOINTMENT (OUTPATIENT)
Dept: LABOR AND DELIVERY | Age: 22
End: 2022-06-26
Payer: COMMERCIAL

## 2022-06-26 PROBLEM — Z83.3 FAMILY HISTORY OF DIABETES MELLITUS: Status: ACTIVE | Noted: 2022-06-26

## 2022-06-26 PROBLEM — Z3A.38 38 WEEKS GESTATION OF PREGNANCY: Status: ACTIVE | Noted: 2022-06-26

## 2022-06-26 LAB
ABO/RH: NORMAL
ABSOLUTE EOS #: 0.07 K/UL (ref 0–0.44)
ABSOLUTE IMMATURE GRANULOCYTE: 0.05 K/UL (ref 0–0.3)
ABSOLUTE LYMPH #: 2.01 K/UL (ref 1.1–3.7)
ABSOLUTE MONO #: 0.9 K/UL (ref 0.1–1.2)
ANTIBODY SCREEN: NEGATIVE
ARM BAND NUMBER: NORMAL
BASOPHILS # BLD: 0 % (ref 0–2)
BASOPHILS ABSOLUTE: 0.03 K/UL (ref 0–0.2)
EOSINOPHILS RELATIVE PERCENT: 1 % (ref 1–4)
EXPIRATION DATE: NORMAL
HCT VFR BLD CALC: 34.4 % (ref 36.3–47.1)
HEMOGLOBIN: 10.7 G/DL (ref 11.9–15.1)
IMMATURE GRANULOCYTES: 0 %
INR BLD: 0.9
LYMPHOCYTES # BLD: 17 % (ref 24–43)
MCH RBC QN AUTO: 26.6 PG (ref 25.2–33.5)
MCHC RBC AUTO-ENTMCNC: 31.1 G/DL (ref 28.4–34.8)
MCV RBC AUTO: 85.4 FL (ref 82.6–102.9)
MONOCYTES # BLD: 8 % (ref 3–12)
NRBC AUTOMATED: 0.2 PER 100 WBC
PARTIAL THROMBOPLASTIN TIME: 23.4 SEC (ref 20.5–30.5)
PDW BLD-RTO: 13.8 % (ref 11.8–14.4)
PLATELET # BLD: 192 K/UL (ref 138–453)
PMV BLD AUTO: 11.6 FL (ref 8.1–13.5)
PROTHROMBIN TIME: 9.7 SEC (ref 9.1–12.3)
RBC # BLD: 4.03 M/UL (ref 3.95–5.11)
SEG NEUTROPHILS: 74 % (ref 36–65)
SEGMENTED NEUTROPHILS ABSOLUTE COUNT: 8.87 K/UL (ref 1.5–8.1)
WBC # BLD: 11.9 K/UL (ref 3.5–11.3)

## 2022-06-26 PROCEDURE — 85730 THROMBOPLASTIN TIME PARTIAL: CPT

## 2022-06-26 PROCEDURE — 86900 BLOOD TYPING SEROLOGIC ABO: CPT

## 2022-06-26 PROCEDURE — 1220000000 HC SEMI PRIVATE OB R&B

## 2022-06-26 PROCEDURE — 86850 RBC ANTIBODY SCREEN: CPT

## 2022-06-26 PROCEDURE — 85610 PROTHROMBIN TIME: CPT

## 2022-06-26 PROCEDURE — 85025 COMPLETE CBC W/AUTO DIFF WBC: CPT

## 2022-06-26 PROCEDURE — 86901 BLOOD TYPING SEROLOGIC RH(D): CPT

## 2022-06-26 PROCEDURE — 2580000003 HC RX 258: Performed by: STUDENT IN AN ORGANIZED HEALTH CARE EDUCATION/TRAINING PROGRAM

## 2022-06-26 PROCEDURE — 86780 TREPONEMA PALLIDUM: CPT

## 2022-06-26 RX ORDER — SODIUM CHLORIDE, SODIUM LACTATE, POTASSIUM CHLORIDE, AND CALCIUM CHLORIDE .6; .31; .03; .02 G/100ML; G/100ML; G/100ML; G/100ML
1000 INJECTION, SOLUTION INTRAVENOUS PRN
Status: DISCONTINUED | OUTPATIENT
Start: 2022-06-26 | End: 2022-06-28

## 2022-06-26 RX ORDER — DIPHENHYDRAMINE HCL 25 MG
25 TABLET ORAL EVERY 4 HOURS PRN
Status: DISCONTINUED | OUTPATIENT
Start: 2022-06-26 | End: 2022-06-28

## 2022-06-26 RX ORDER — SODIUM CHLORIDE 9 MG/ML
25 INJECTION, SOLUTION INTRAVENOUS PRN
Status: DISCONTINUED | OUTPATIENT
Start: 2022-06-26 | End: 2022-06-28

## 2022-06-26 RX ORDER — SODIUM CHLORIDE 0.9 % (FLUSH) 0.9 %
5-40 SYRINGE (ML) INJECTION EVERY 12 HOURS SCHEDULED
Status: DISCONTINUED | OUTPATIENT
Start: 2022-06-26 | End: 2022-06-28

## 2022-06-26 RX ORDER — SODIUM CHLORIDE, SODIUM LACTATE, POTASSIUM CHLORIDE, CALCIUM CHLORIDE 600; 310; 30; 20 MG/100ML; MG/100ML; MG/100ML; MG/100ML
INJECTION, SOLUTION INTRAVENOUS CONTINUOUS
Status: DISCONTINUED | OUTPATIENT
Start: 2022-06-26 | End: 2022-06-28

## 2022-06-26 RX ORDER — SODIUM CHLORIDE, SODIUM LACTATE, POTASSIUM CHLORIDE, AND CALCIUM CHLORIDE .6; .31; .03; .02 G/100ML; G/100ML; G/100ML; G/100ML
500 INJECTION, SOLUTION INTRAVENOUS PRN
Status: DISCONTINUED | OUTPATIENT
Start: 2022-06-26 | End: 2022-06-28

## 2022-06-26 RX ORDER — SODIUM CHLORIDE 0.9 % (FLUSH) 0.9 %
5-40 SYRINGE (ML) INJECTION PRN
Status: DISCONTINUED | OUTPATIENT
Start: 2022-06-26 | End: 2022-06-28

## 2022-06-26 RX ORDER — ACETAMINOPHEN 500 MG
1000 TABLET ORAL EVERY 6 HOURS PRN
Status: DISCONTINUED | OUTPATIENT
Start: 2022-06-26 | End: 2022-06-30 | Stop reason: HOSPADM

## 2022-06-26 RX ORDER — ONDANSETRON 2 MG/ML
4 INJECTION INTRAMUSCULAR; INTRAVENOUS EVERY 6 HOURS PRN
Status: DISCONTINUED | OUTPATIENT
Start: 2022-06-26 | End: 2022-06-28

## 2022-06-26 RX ADMIN — SODIUM CHLORIDE, POTASSIUM CHLORIDE, SODIUM LACTATE AND CALCIUM CHLORIDE: 600; 310; 30; 20 INJECTION, SOLUTION INTRAVENOUS at 23:18

## 2022-06-27 PROBLEM — O13.9 GESTATIONAL HYPERTENSION: Status: ACTIVE | Noted: 2022-06-27

## 2022-06-27 LAB
ALBUMIN SERPL-MCNC: 3.2 G/DL (ref 3.5–5.2)
ALBUMIN/GLOBULIN RATIO: 1 (ref 1–2.5)
ALP BLD-CCNC: 147 U/L (ref 35–104)
ALT SERPL-CCNC: 7 U/L (ref 5–33)
AMPHETAMINE SCREEN URINE: NEGATIVE
ANION GAP SERPL CALCULATED.3IONS-SCNC: 13 MMOL/L (ref 9–17)
AST SERPL-CCNC: 11 U/L
BARBITURATE SCREEN URINE: NEGATIVE
BENZODIAZEPINE SCREEN, URINE: NEGATIVE
BILIRUB SERPL-MCNC: 0.28 MG/DL (ref 0.3–1.2)
BUN BLDV-MCNC: 7 MG/DL (ref 6–20)
CALCIUM SERPL-MCNC: 8.5 MG/DL (ref 8.6–10.4)
CANNABINOID SCREEN URINE: NEGATIVE
CHLORIDE BLD-SCNC: 102 MMOL/L (ref 98–107)
CO2: 21 MMOL/L (ref 20–31)
COCAINE METABOLITE, URINE: NEGATIVE
CREAT SERPL-MCNC: 0.42 MG/DL (ref 0.5–0.9)
CREATININE URINE: 209.5 MG/DL (ref 28–217)
GFR AFRICAN AMERICAN: >60 ML/MIN
GFR NON-AFRICAN AMERICAN: >60 ML/MIN
GFR SERPL CREATININE-BSD FRML MDRD: ABNORMAL ML/MIN/{1.73_M2}
GLUCOSE BLD-MCNC: 86 MG/DL (ref 70–99)
METHADONE SCREEN, URINE: NEGATIVE
OPIATES, URINE: NEGATIVE
OXYCODONE SCREEN URINE: NEGATIVE
PHENCYCLIDINE, URINE: NEGATIVE
POTASSIUM SERPL-SCNC: 4.1 MMOL/L (ref 3.7–5.3)
REASON FOR REJECTION: NORMAL
SODIUM BLD-SCNC: 136 MMOL/L (ref 135–144)
T. PALLIDUM, IGG: NONREACTIVE
TEST INFORMATION: NORMAL
TOTAL PROTEIN, URINE: 22 MG/DL
TOTAL PROTEIN: 6.3 G/DL (ref 6.4–8.3)
URINE TOTAL PROTEIN CREATININE RATIO: 0.11 (ref 0–0.2)
ZZ NTE CLEAN UP: ORDERED TEST: NORMAL
ZZ NTE WITH NAME CLEAN UP: SPECIMEN SOURCE: NORMAL

## 2022-06-27 PROCEDURE — 82570 ASSAY OF URINE CREATININE: CPT

## 2022-06-27 PROCEDURE — 1220000000 HC SEMI PRIVATE OB R&B

## 2022-06-27 PROCEDURE — 2500000003 HC RX 250 WO HCPCS: Performed by: STUDENT IN AN ORGANIZED HEALTH CARE EDUCATION/TRAINING PROGRAM

## 2022-06-27 PROCEDURE — 96374 THER/PROPH/DIAG INJ IV PUSH: CPT

## 2022-06-27 PROCEDURE — 80307 DRUG TEST PRSMV CHEM ANLYZR: CPT

## 2022-06-27 PROCEDURE — 6360000002 HC RX W HCPCS: Performed by: STUDENT IN AN ORGANIZED HEALTH CARE EDUCATION/TRAINING PROGRAM

## 2022-06-27 PROCEDURE — 96372 THER/PROPH/DIAG INJ SC/IM: CPT

## 2022-06-27 PROCEDURE — 96375 TX/PRO/DX INJ NEW DRUG ADDON: CPT

## 2022-06-27 PROCEDURE — 2580000003 HC RX 258: Performed by: STUDENT IN AN ORGANIZED HEALTH CARE EDUCATION/TRAINING PROGRAM

## 2022-06-27 PROCEDURE — A4216 STERILE WATER/SALINE, 10 ML: HCPCS | Performed by: STUDENT IN AN ORGANIZED HEALTH CARE EDUCATION/TRAINING PROGRAM

## 2022-06-27 PROCEDURE — 59200 INSERT CERVICAL DILATOR: CPT

## 2022-06-27 PROCEDURE — 84156 ASSAY OF PROTEIN URINE: CPT

## 2022-06-27 PROCEDURE — 80053 COMPREHEN METABOLIC PANEL: CPT

## 2022-06-27 PROCEDURE — 6370000000 HC RX 637 (ALT 250 FOR IP)

## 2022-06-27 RX ORDER — MORPHINE SULFATE 2 MG/ML
2 INJECTION, SOLUTION INTRAMUSCULAR; INTRAVENOUS ONCE
Status: COMPLETED | OUTPATIENT
Start: 2022-06-27 | End: 2022-06-27

## 2022-06-27 RX ORDER — CALCIUM CARBONATE 200(500)MG
1000 TABLET,CHEWABLE ORAL 2 TIMES DAILY PRN
Status: DISCONTINUED | OUTPATIENT
Start: 2022-06-27 | End: 2022-06-30 | Stop reason: HOSPADM

## 2022-06-27 RX ORDER — MORPHINE SULFATE 10 MG/ML
10 INJECTION, SOLUTION INTRAMUSCULAR; INTRAVENOUS ONCE
Status: DISCONTINUED | OUTPATIENT
Start: 2022-06-27 | End: 2022-06-27

## 2022-06-27 RX ORDER — MORPHINE SULFATE 10 MG/ML
8 INJECTION, SOLUTION INTRAMUSCULAR; INTRAVENOUS ONCE
Status: COMPLETED | OUTPATIENT
Start: 2022-06-27 | End: 2022-06-27

## 2022-06-27 RX ORDER — PROCHLORPERAZINE EDISYLATE 5 MG/ML
10 INJECTION INTRAMUSCULAR; INTRAVENOUS ONCE
Status: COMPLETED | OUTPATIENT
Start: 2022-06-27 | End: 2022-06-27

## 2022-06-27 RX ADMIN — ANTACID TABLETS 1000 MG: 500 TABLET, CHEWABLE ORAL at 18:07

## 2022-06-27 RX ADMIN — SODIUM CHLORIDE, POTASSIUM CHLORIDE, SODIUM LACTATE AND CALCIUM CHLORIDE: 600; 310; 30; 20 INJECTION, SOLUTION INTRAVENOUS at 14:38

## 2022-06-27 RX ADMIN — Medication 1 MILLI-UNITS/MIN: at 13:58

## 2022-06-27 RX ADMIN — PROCHLORPERAZINE EDISYLATE 10 MG: 5 INJECTION INTRAMUSCULAR; INTRAVENOUS at 13:08

## 2022-06-27 RX ADMIN — ONDANSETRON 4 MG: 2 INJECTION, SOLUTION INTRAMUSCULAR; INTRAVENOUS at 19:35

## 2022-06-27 RX ADMIN — MORPHINE SULFATE 2 MG: 2 INJECTION, SOLUTION INTRAMUSCULAR; INTRAVENOUS at 13:22

## 2022-06-27 RX ADMIN — FAMOTIDINE 20 MG: 10 INJECTION INTRAVENOUS at 22:22

## 2022-06-27 RX ADMIN — DINOPROSTONE 10 MG: 10 INSERT VAGINAL at 00:30

## 2022-06-27 RX ADMIN — MORPHINE SULFATE 8 MG: 10 INJECTION INTRAVENOUS at 13:07

## 2022-06-27 RX ADMIN — ANTACID TABLETS 1000 MG: 500 TABLET, CHEWABLE ORAL at 04:10

## 2022-06-27 ASSESSMENT — PAIN SCALES - GENERAL
PAINLEVEL_OUTOF10: 2
PAINLEVEL_OUTOF10: 2

## 2022-06-27 NOTE — H&P
OBSTETRICAL HISTORY [facility]    Date: 2022       Time: 12:03 AM   Patient Name: Pamela White     Patient : 2000  Room/Bed: 0710/0710-01    Admission Date/Time: 2022 10:00 PM    CC: Risk Reducing Induction of Labor     HPI: Pamela White is a 25 y.o.  at 38w7d who presents for risk reducing induction of labor at 39w0d. The patient reports fetal movement is present, denies contractions, denies loss of fluid, denies vaginal bleeding. Patient denies headache, vision changes, nausea, vomiting, fever, chills, shortness of breath, chest pain, RUQ pain, abdominal pain, diarrhea, change in color/amount/odor of vaginal discharge, dysuria or, hematuria. DATING:  LMP: No LMP recorded (approximate). Patient is pregnant.   Estimated Date of Delivery: 22   Based on: early ultrasound, at 6 0/7 weeks GA    PREGNANCY RISK FACTORS:  Patient Active Problem List   Diagnosis    Circumvallate Placenta    Low-lying placenta (resolved)    Elevated Bp x 1     High-risk pregnancy in third trimester    Family history of diabetes mellitus    38 weeks gestation of pregnancy        Steroids Given In This Pregnancy:  no     REVIEW OF SYSTEMS:   Constitutional: negative fever, negative chills, negative weight changes   HEENT: negative visual disturbances, negative headaches, negative dizziness, negative hearing loss  Breast: Negative breast abnormalities, negative breast lumps, negative nipple discharge  Respiratory: negative dyspnea, negative cough, negative SOB  Cardiovascular: negative chest pain,  negative palpitations, negative arrhythmia, negative syncope   Gastrointestinal: negative abdominal pain, negative RUQ pain, negative N/V, negative diarrhea, negative constipation, negative bowel changes, negative heartburn   Genitourinary: negative dysuria, negative hematuria, negative urinary incontinence, negative vaginal discharge, negative vaginal bleeding or spotting  Dermatological: negative rash, negative pruritis, negative mole or other skin changes  Hematologic: negative bruising  Immunologic/Lymphatic: negative recent illness, negative recent sick contact  Musculoskeletal: negative back pain, negative myalgias, negative arthralgias  Neurological:  negative dizziness, negative migraines, negative seizures, negative weakness  Behavior/Psych: negative depression, negative anxiety, negative SI, negative HI    OBSTETRICAL HISTORY:   OB History    Para Term  AB Living   1 0 0 0 0 0   SAB IAB Ectopic Molar Multiple Live Births   0 0 0 0 0 0      # Outcome Date GA Lbr Daniel/2nd Weight Sex Delivery Anes PTL Lv   1 Current                PAST MEDICAL HISTORY:   has a past medical history of Anemia, Atopic dermatitis, Depression, Multiple fractures, Otitis media, left, Sports physical, Sprain of interphalangeal joint of right thumb, and Vasovagal near syncope. PAST SURGICAL HISTORY:   has no past surgical history on file. ALLERGIES:  has No Known Allergies. MEDICATIONS:  Prior to Admission medications    Medication Sig Start Date End Date Taking? Authorizing Provider   famotidine (PEPCID) 20 MG tablet Take 1 tablet by mouth 2 times daily 22   Agustin Salazar DO   Prenatal Vit-Fe Fumarate-FA (PRENATAL VITAMINS PO) Take 1 tablet by mouth daily 21   Historical Provider, MD       FAMILY HISTORY:  family history includes Arthritis in her maternal grandmother; Asthma in her maternal grandmother and sister; Bipolar Disorder in her maternal aunt; Cancer in her paternal grandfather; Diabetes in her maternal grandfather, mother, and paternal grandfather; Heart Disease in her maternal grandfather, mother, and paternal grandfather; Hypertension in her father and mother; Irritable Bowel Syndrome in her mother; Rheum Arthritis in her mother; Stroke in her paternal grandmother.     SOCIAL HISTORY:   reports that she is a non-smoker but has been exposed to tobacco smoke. She has never used smokeless tobacco. She reports previous alcohol use. She reports previous drug use.     VITALS:  Vitals:    06/26/22 2216   BP: 102/87   Pulse: (!) 113   Resp: 16   Temp: 99.1 °F (37.3 °C)   TempSrc: Oral   SpO2: 98%      PHYSICAL EXAM:  Fetal Heart Monitor:  Baseline Heart Rate 135, moderate variability, present accelerations, absent decelerations  [place]: contractions, rare    General appearance:  no apparent distress, alert, and cooperative  HEENT: head atraumatic, normocephalic, moist mucous membranes, trachea midline  Neurologic:  alert, oriented, normal speech, no focal findings or movement disorder noted  Lungs:  No increased work of breathing, good air exchange, clear to auscultation bilaterally, no crackles or wheezing  Heart:  regular rate and rhythm and no murmur    Abdomen:  soft, gravid, and non-tender  Extremities:  no calf tenderness,  Nonedematous   Musculoskeletal: Gross strength equal and intact throughout, no gross abnormalities, range of motion normal in hips, knees, shoulders and spine  Psychiatric: Mood appropriate, normal affect   Rectal Exam: not indicated  Pelvic Exam:   Chaperone for Intimate Exam: Chaperone was present for entire exam, Chaperone Name: Jeffery Carlson RN  Sterile Vaginal Exam:  Cervix: No cervical motion tenderness   Uterus: Is gravid, Normal size, shape, consistency and non-tender   Adnexa: Non-tender, no palpable masses  Cervix: 1 cm dilated, 50 % effaced, -3 station, posterior position (out of 3 station), firm consistency, FETAL POSITION: Cephalic (confirmed by ultrasound), Membranes intact,    Bishops Score: 2     0 1 2 3   Position Posterior Intermediate Anterior -   Consistency Firm Intermediate Soft -   Effacement 0-30% 31-50% 51-80% >80%   Dilation 0cm 1-2cm 3-4cm >5cm   Fetal Station -3 -2 -1, 0 +1, +2     LIMITED BEDSIDE US:  Position: Cephalic  Placental Location: anterior  Fetal Heart Tones: Present  Fetal Movement: Present  Amniotic Fluid Index/Volume:  adequate 2x2 cm fluid pocket  Estimated Fetal Weight:  8 lbs 1oz    PRENATAL LAB RESULTS:   Blood Type/Rh: B pos  Antibody Screen: negative  Hemoglobin, Hematocrit, Platelets: Hgb 13. 0/Hct 38.0/Plt 261  Rubella: immune  T. Pallidum, IgG: non-reactive  Hepatitis B Surface Antigen: non-reactive   Hepatitis C Antibody: not done   HIV: non-reactive   Sickle Cell Screen: not done  Gonorrhea: negative  Chlamydia: negative  Urine culture: negative, date: 22    Early 1 hour Glucose Tolerance Test: 95  1 hour Glucose Tolerance Test: 100    Group B Strep: negative RV culture on 22  Cystic Fibrosis Screen: negative  First Trimester Screen: low risk for aneuploidy  Quad screen: negative  Non-Invasive Prenatal Testing: not done  Anatomy US: anterior circumvallate placenta with lakes, 3VC, normal fetal anatomy    ASSESSMENT & PLAN:  Aura Montague is a 25 y.o. female  at 38w7d IUP   - GBS negative / Rh positive / R immune   - No indication for GBS prophylaxis    RR IOL @ 0000 on 22   - Admit to labor and delivery under the service of Dr. Linda Hess   - VSS    - cEFM and TOCO   - Cat 1 FHT and TOCO showing rare contractions   - CBC, T&S, T.Pal ordered   - UDS ordered. R/B/A discussed with patient and patient agreeable   - IVF: LR @ 125 cc/hr   - Plan of Induction: Cervidil to be placed after midnight when patient is 39w0d   - Due to risk factors: EFW 8#1, discussed increased risk of shoulder dystocia with patient. Discussed the R/B/A of vaginal delivery VS  delivery due to risk of brachial plexus palsies, clavicular/humeral fracture, long term neurological issues, asphyxia, or  death. Patient verbalized and expressed understanding. Patient would like to proceed with a vaginal delivery at this time. Elevated BP x1   - Pt with elevated BP at [facility] appointment 22.  PreE labs at that time wnl, P/C 0.13   - Normotensive on admission   - Denies s/s preE   - Will obtain repeat preE labs if patient meets criteria for gHTN    Low lying placenta   - Resolved    Circumvallate Placenta    FHx DM   - Early 1hr GTT wnl    BMI 36  Patient Active Problem List    Diagnosis Date Noted    Low-lying placenta (resolved) 02/14/2022     Priority: High     Pelvic rest/ no heavy lifting       38 weeks gestation of pregnancy 06/26/2022     Priority: Medium    High-risk pregnancy in third trimester 06/20/2022     Priority: Medium    Family history of diabetes mellitus 06/26/2022     Early 1hr GTT      Elevated Bp x 1  06/20/2022 6/20/22: 154/88 at [facility]  Pre E labs wnl, P/C 0.13      Circumvallate Placenta 02/14/2022       Plan discussed with Dr. Glynn Overton, who is agreeable. Steroids given this admission: No    Risks, benefits, alternatives and possible complications have been discussed in detail with the patient. Admission, and post admission procedures and expectations were discussed in detail. All questions were answered.     Attending's Name: Dr. Yaniv Rankin MD  Ob/Gyn Resident  6/27/2022, 12:03 AM

## 2022-06-27 NOTE — DISCHARGE SUMMARY
Obstetric Discharge Summary  [facility]    Patient Name: Merrie Mcburney  Patient : 2000  Primary Care Physician: No primary care provider on file. Admit Date: 2022    Principal Diagnosis: IUP at 38w6d, admitted for RR IOL at 39w0d     Her pregnancy has been complicated by:   Patient Active Problem List   Diagnosis    Circumvallate Placenta    Low-lying placenta (resolved)    Elevated Bp x 1     High-risk pregnancy in third trimester    Family history of diabetes mellitus    38 weeks gestation of pregnancy    gHTN      22 M Apg 8/9 Wt 7#4    39 weeks gestation of pregnancy    Encounter for induction of labor       Infection Present?: No  Hospital Acquired: No    Surgical Operations & Procedures:  Analgesia: epidural  Delivery Type: Spontaneous Vaginal Delivery: See Labor and Delivery Summary   Laceration(s): 2nd degree midline laceration repaired with 3-0 vicryl    Consultations: Anesthesia    Pertinent Findings & Procedures:   Merrie Mcburney is a 25 y.o. female  at 38w7d admitted for Risk reducing IOL at 39w0d; received Cervidil x1, Morphine/Compazine, Pitocin, Og balloon, IUPC, AROM (clr), epidural. She met critieria for gHTN, preE labs were wnl, P/C 0.11. She delivered by induced vaginal a Live Born infant on 22. Information for the patient's :  Levorn Puff [[id number]]   male   Birth Weight: 7 lb 4.4 oz (3.3 kg)       Apgars: 8 at 1 minute and 9 at 5 minutes. Postpartum course: normal. Hgb on PPD#0 10.6.      Course of patient: complicated by gHTN    Discharge to: Home    Readmission planned: no     Recommendations on Discharge:     Medications:      Medication List      START taking these medications    docusate sodium 100 MG capsule  Commonly known as: COLACE  Take 1 capsule by mouth 2 times daily as needed for Constipation     ibuprofen 800 MG tablet  Commonly known as: ADVIL;MOTRIN  Take 1 tablet by mouth every 8 hours as needed for Pain        CONTINUE taking these medications    famotidine 20 MG tablet  Commonly known as: PEPCID  Take 1 tablet by mouth 2 times daily     PRENATAL VITAMINS PO           Where to Get Your Medications      These medications were sent to [facility] [address] [facility], [address] [place] [address]  [address], [address] [place] [address]    Phone: [phone]   · docusate sodium 100 MG capsule  · ibuprofen 800 MG tablet          Activity: pelvic rest x 6 weeks  Diet: regular diet  Follow up: 1 week for BP check    Condition on discharge: stable    Discharge date: 6/30/22    Stef Landeros MD  Ob/Gyn Resident    Comments:  Home care and follow-up care were reviewed. Pelvic rest, and birth control were reviewed. Signs and symptoms of mastitis and post partum depression were reviewed. The patient is to notify her physician if any of these occur. The patient was counseled on secondary smoke risks and the increased risk of sudden infant death syndrome and respiratory problems to her baby with exposure. She was counseled on various alternate recommendations to decrease the exposure to secondary smoke to her children.

## 2022-06-27 NOTE — PROGRESS NOTES
(Positive cutoff 300 ng/mL)                  Methadone Screen, Urine 06/27/2022 NEGATIVE  NEGATIVE Final    Comment:       (Positive cutoff 300 ng/mL)                  Opiates, Urine 06/27/2022 NEGATIVE  NEGATIVE Final    Comment:       (Positive cutoff 300 ng/mL)                  Phencyclidine, Urine 06/27/2022 NEGATIVE  NEGATIVE Final    Comment:       (Positive cutoff 25 ng/mL)                  Cannabinoid Scrn, Ur 06/27/2022 NEGATIVE  NEGATIVE Final    Comment:       (Positive cutoff 50 ng/mL)                  Oxycodone Screen, Ur 06/27/2022 NEGATIVE  NEGATIVE Final    Comment:       (Positive cutoff 100 ng/mL)                  Test Information 06/27/2022 Assay provides medical screening only. The absence of expected drug(s) and/or metabolite(s) may indicate diluted or adulterated urine, limitations of testing or timing of collection. Final    Comment: Testing for legal purposes should be confirmed by another method. To request confirmation   of test result, please call the lab within 7 days of sample submission.  T. pallidum, IgG 06/26/2022 NONREACTIVE  NONREACTIVE Final    Comment:       T. pallidum antibodies are not detected. There is no serological evidence of infection with T. pallidum (early primary syphilis   cannot be excluded). Retest in 2-4 weeks if syphilis is clinically suspect.             WBC 06/26/2022 11.9* 3.5 - 11.3 k/uL Final    RBC 06/26/2022 4.03  3.95 - 5.11 m/uL Final    Hemoglobin 06/26/2022 10.7* 11.9 - 15.1 g/dL Final    Hematocrit 06/26/2022 34.4* 36.3 - 47.1 % Final    MCV 06/26/2022 85.4  82.6 - 102.9 fL Final    MCH 06/26/2022 26.6  25.2 - 33.5 pg Final    MCHC 06/26/2022 31.1  28.4 - 34.8 g/dL Final    RDW 06/26/2022 13.8  11.8 - 14.4 % Final    Platelets 91/03/6713 192  138 - 453 k/uL Final    MPV 06/26/2022 11.6  8.1 - 13.5 fL Final    NRBC Automated 06/26/2022 0.2* 0.0 per 100 WBC Final    Seg Neutrophils 06/26/2022 74* 36 - 65 % Final    Lymphocytes 06/26/2022 17* 24 - 43 % Final    Monocytes 06/26/2022 8  3 - 12 % Final    Eosinophils % 06/26/2022 1  1 - 4 % Final    Basophils 06/26/2022 0  0 - 2 % Final    Immature Granulocytes 06/26/2022 0  0 % Final    Segs Absolute 06/26/2022 8.87* 1.50 - 8.10 k/uL Final    Absolute Lymph # 06/26/2022 2.01  1.10 - 3.70 k/uL Final    Absolute Mono # 06/26/2022 0.90  0.10 - 1.20 k/uL Final    Absolute Eos # 06/26/2022 0.07  0.00 - 0.44 k/uL Final    Basophils Absolute 06/26/2022 0.03  0.00 - 0.20 k/uL Final    Absolute Immature Granulocyte 06/26/2022 0.05  0.00 - 0.30 k/uL Final    Protime 06/26/2022 9.7  9.1 - 12.3 sec Final    INR 06/26/2022 0.9   Final    Comment:       Therapeutic Range:    Moderate Anticoagulant Intensity:     INR = 2.0-3.0   High Anticoagulant Intensity:     INR = 2.5-3.5            PTT 06/26/2022 23.4  20.5 - 30.5 sec Final    Comment:       IV Heparin Therapy Range:  48.6-77.8     Admission on 06/20/2022, Discharged on 06/20/2022   Component Date Value Ref Range Status    WBC 06/20/2022 10.3  4.5 - 13.5 k/uL Final    RBC 06/20/2022 4.14  3.95 - 5.11 m/uL Final    Hemoglobin 06/20/2022 11.2* 11.9 - 15.1 g/dL Final    Hematocrit 06/20/2022 35.6* 36.3 - 47.1 % Final    MCV 06/20/2022 86.0  82.6 - 102.9 fL Final    MCH 06/20/2022 27.1  25.2 - 33.5 pg Final    MCHC 06/20/2022 31.5  28.4 - 34.8 g/dL Final    RDW 06/20/2022 13.3  11.8 - 14.4 % Final    Platelets 76/24/6292 192  138 - 453 k/uL Final    MPV 06/20/2022 11.5  8.1 - 13.5 fL Final    NRBC Automated 06/20/2022 0.0  0.0 per 100 WBC Final    Seg Neutrophils 06/20/2022 74* 34 - 64 % Final    Lymphocytes 06/20/2022 17* 25 - 45 % Final    Monocytes 06/20/2022 8  2 - 8 % Final    Eosinophils % 06/20/2022 1  1 - 4 % Final    Basophils 06/20/2022 0  0 - 2 % Final    Immature Granulocytes 06/20/2022 0  0 % Final    Segs Absolute 06/20/2022 7.61  1.50 - 8.10 k/uL Final    Absolute Lymph # 06/20/2022 1.74  1.10 - 3.70 k/uL Final    Absolute Mono # 06/20/2022 0.82  0.10 - 1.40 k/uL Final    Absolute Eos # 06/20/2022 0.07  0.00 - 0.44 k/uL Final    Basophils Absolute 06/20/2022 0.03  0.00 - 0.20 k/uL Final    Absolute Immature Granulocyte 06/20/2022 0.04  0.00 - 0.30 k/uL Final    Glucose 06/20/2022 81  70 - 99 mg/dL Final    BUN 06/20/2022 7  6 - 20 mg/dL Final    CREATININE 06/20/2022 0.38* 0.50 - 0.90 mg/dL Final    Calcium 06/20/2022 8.6  8.6 - 10.4 mg/dL Final    Sodium 06/20/2022 132* 135 - 144 mmol/L Final    Potassium 06/20/2022 4.1  3.7 - 5.3 mmol/L Final    Chloride 06/20/2022 103  98 - 107 mmol/L Final    CO2 06/20/2022 20  20 - 31 mmol/L Final    Anion Gap 06/20/2022 9  9 - 17 mmol/L Final    Alkaline Phosphatase 06/20/2022 135* 35 - 104 U/L Final    ALT 06/20/2022 9  5 - 33 U/L Final    AST 06/20/2022 14  <32 U/L Final    Total Bilirubin 06/20/2022 0.23* 0.3 - 1.2 mg/dL Final    Total Protein 06/20/2022 6.3* 6.4 - 8.3 g/dL Final    Albumin 06/20/2022 3.2* 3.5 - 5.2 g/dL Final    Albumin/Globulin Ratio 06/20/2022 1.0  1.0 - 2.5 Final    GFR Non- 06/20/2022 >60  >60 mL/min Final    GFR  06/20/2022 >60  >60 mL/min Final    GFR Comment 06/20/2022        Final    Comment: Average GFR for 20-28 years old:   80 mL/min/1.73sq m  Chronic Kidney Disease:   <60 mL/min/1.73sq m  Kidney failure:   <15 mL/min/1.73sq m              eGFR calculated using average adult body mass. Additional eGFR calculator available at:        [employer]            Total Protein, Urine 06/20/2022 16  mg/dL Final    No normal range established.     Creatinine, Ur 06/20/2022 124.8  28.0 - 217.0 mg/dL Final    Urine Total Protein Creatinine Rat* 06/20/2022 0.13  0.00 - 0.20 Final   Admission on 06/19/2022, Discharged on 06/19/2022   Component Date Value Ref Range Status    Color, UA 06/19/2022 Yellow  Yellow Final    Turbidity UA 06/19/2022 Cloudy* Clear Final    Glucose, Ur 2022 NEGATIVE  NEGATIVE Final    Bilirubin Urine 2022 NEGATIVE  NEGATIVE Final    Ketones, Urine 2022 NEGATIVE  NEGATIVE Final    Specific [place], UA 2022 1.011  1.000 - 1.030 Final    Urine Hgb 2022 NEGATIVE  NEGATIVE Final    pH, UA 2022 7.0  5.0 - 8.0 Final    Protein, UA 2022 NEGATIVE  NEGATIVE Final    Urobilinogen, Urine 2022 Normal  Normal Final    Nitrite, Urine 2022 NEGATIVE  NEGATIVE Final    Leukocyte Esterase, Urine 2022 MOD* NEGATIVE Final    WBC, UA 2022 21 TO 50  /HPF Final    RBC, UA 2022 0 TO 2  /HPF Final    Casts UA 2022 0 TO 2  /LPF Final    Epithelial Cells UA 2022 21 TO 50  /HPF Final    Bacteria, UA 2022 FEW* None Final    Specimen Description 2022 . CLEAN CATCH URINE   Final    Culture 2022 NO SIGNIFICANT GROWTH   Final   ]    BP (!) 143/97   Pulse (!) 103   Temp 99 °F (37.2 °C) (Oral)   Resp 19   LMP  (Approximate)   SpO2 97%       Pelvic Exam:  Cervix Check: deferred grace in place      Assessment:  1Sparkle Costa is a 25 y.o. female  39w0d     2.   OB History    Para Term  AB Living   1             SAB IAB Ectopic Molar Multiple Live Births                    # Outcome Date GA Lbr Daniel/2nd Weight Sex Delivery Anes PTL Lv   1 Current                  3. 38 weeks gestation of pregnancy [Z3A.38]      4. Patient Active Problem List    Diagnosis Date Noted    Low-lying placenta (resolved) 2022     Priority: High     Pelvic rest/ no heavy lifting       38 weeks gestation of pregnancy 2022     Priority: Medium    High-risk pregnancy in third trimester 2022     Priority: Medium    Family history of diabetes mellitus 2022     Early 1hr GTT      Elevated Bp x 1  2022: 154/88 at [facility]  Pre E labs wnl, P/C 0.13      Circumvallate Placenta 2022       5.  IOL d/t elevated BPs/ pt request          Plan:   1. Continue with current care plan  2.  Pitocin with grace balloon        Electronically signed by Eleno Coleman DO on 6/27/2022 at 2:37 PM

## 2022-06-27 NOTE — PROGRESS NOTES
Labor Progress Note    Kiki Siegel is a 25 y.o. female  at 36w0d  The patient was seen and examined. Her pain is well controlled. She reports fetal movement is present, denies contractions, denies loss of fluid, denies vaginal bleeding.        Vital Signs:  Vitals:    22 2216   BP: 102/87   Pulse: (!) 113   Resp: 16   Temp: 99.1 °F (37.3 °C)   TempSrc: Oral   SpO2: 98%     FHT: 135, moderate variability, accelerations present, decelerations absent  Contractions: rare    Chaperone for Intimate Exam: Chaperone was present for entire exam, Chaperone Name: Kale Vargas RN  Cervical Exam: 1 cm dilated, 50 effaced, -3 station  Pitocin: @ 0 mu/min    Membranes: Intact  Scalp Electrode in place: absent  Intrauterine Pressure Catheter in Place: absent    Interventions: Cervidil placement    Assessment/Plan:  iKki Siegel is a 25 y.o. female  at 39w0d admitted for RR IOL   - GBS negative, No indication for GBS prophylaxis   - VSS, afebrile   - cEFM/TOCO   - Cervidil in place, out @ 1230 on 22   - Continue to monitor closely     Attending updated and in agreement with plan    David Alberto MD  Ob/Gyn Resident  2022, 12:34 AM

## 2022-06-27 NOTE — PROGRESS NOTES
Labor Progress Note    Odin Felder is a 25 y.o. female  at 36w0d  The patient was seen and examined. Her pain is well controlled without medication. She reports fetal movement is present, complains of contractions, denies loss of fluid, denies vaginal bleeding. Vital Signs:  Vitals:    22 1534 22 1600 22 1700 22 1800   BP: (!) 143/90 (!) 137/93 (!) 140/84 132/84   Pulse: 97 (!) 102 97 (!) 109   Resp: 16 18 17 15   Temp:  98.1 °F (36.7 °C)     TempSrc:  Oral     SpO2:             FHT: Baseline 130, moderate variability, accelerations present, decelerations absent, Category 1 tracing  Contractions: irregular, every 2-5 minutes  Cervical Exam: 3-4 cm dilated, 50 effaced, -2 station, soft, posterior   Chaperone for Intimate Exam: Chaperone was present for entire exam, Chaperone Name: Yudith Guo RN  Pitocin: @ 10 mu/min    Membranes: Intact  Scalp Electrode in place: absent  Intrauterine Pressure Catheter in Place: absent          Assessment/Plan:  Odin Felder is a 25 y.o. female  at 39w0d admitted for IUP   - GBS negative, No indication for GBS prophylaxis   - Rh pos, Workup for Rhogam not indicated PP   - Anterior placenta, 3 vc, normal anatomy    Risk Reducing IOL   - VSS, afebrile    - Cat 1 FHT and TOCO showing irregular contractions   - Membranes intact   - SVE 3-4/50%/-2/soft/posterior   - cEFM and TOCO   - LR IVF @ 125 mL/hr   - S/p Cervidil x1   - S/p Morphine/compazine x1   - S/p Og transcervical balloon    - Continue pitocin per protocol   - AROM when able    Discussed with Senior Resident.     Attending updated and in agreement with plan    Jose Metzger DO  Ob/Gyn Resident  Pager: [phone] [facility]   :52 PM

## 2022-06-27 NOTE — FLOWSHEET NOTE
06/27/22 1400   Maternal Vitals (MEW-T)   BP (!) 143/97   Dr. Phi Durham updated, no further orders at this time.

## 2022-06-27 NOTE — CARE COORDINATION
ANTEPARTUM NOTE    38 weeks gestation of pregnancy [Z3A.38]    Ebony Jolly was admitted to L&D on 6/26/2022 for IOL @ 39w0d    OB GYN Provider: Dr. Kenya Caballero    Will meet with patient after delivery to verify name/address/phone/insurance and discuss discharge planning. Anticipate DC home 2 nights after vaginal delivery or 4 nights after C/S delivery as long as hemodynamically stable.

## 2022-06-27 NOTE — PROGRESS NOTES
Labor Progress Note    Jaimee Vu is a 25 y.o. female  at 36w0d  The patient was seen and examined. Her pain is well controlled. She reports fetal movement is present, denies contractions, denies loss of fluid, denies vaginal bleeding. Vital Signs:  Vitals:    22 2216 22 0404 22 0800 22 1205   BP: 102/87 133/72 (!) 112/58 (!) 145/76   Pulse: (!) 113 94 99 (!) 105   Resp: 16 16 16 17   Temp: 99.1 °F (37.3 °C) 98.3 °F (36.8 °C) 98.4 °F (36.9 °C) 99 °F (37.2 °C)   TempSrc: Oral Oral Oral Oral   SpO2: 98%  97%          FHT: 145, moderate variability, accelerations present, decelerations absent  Contractions: not tracing well but patient reports    Chaperone for Intimate Exam: Chaperone was present for entire exam, Chaperone Name: Nghia Chairez RN   Cervical Exam: 1 cm dilated, 50 effaced, -2 station  Pitocin: @ 0 mu/min    Membranes: Intact  Scalp Electrode in place: absent  Intrauterine Pressure Catheter in Place: absent    Interventions: none    Assessment/Plan:  Jaimee Vu is a 25 y.o. female  at 39w0d admitted for RR IOL    - GBS negative, No indication for GBS prophylaxis   - SVE: 1, 50, -2   - S/p Cervidil x1   - Consider transcervical grace balloon next     Attending updated, awaiting plan.     Christian Cat DO  Ob/Gyn Resident  2022, 12:30 PM

## 2022-06-27 NOTE — CARE COORDINATION
06/27/22 0751   Readmission Assessment   Number of Days since last admission? 1-7 days   Previous Disposition Home with Family   Who is being Mikey Renae   (n/a)   What was the patient's/caregiver's perception as to why they think they needed to return back to the hospital?   (n/a)   Did you visit your Primary Care Physician after you left the hospital, before you returned this time?   (n/a)   Did you see a specialist, such as Cardiac, Pulmonary, Orthopedic Physician, etc. after you left the hospital? Yes  (OB)   Who advised the patient to return to the hospital? Physician  (OB)   Does the patient report anything that got in the way of taking their medications?   (n/a)   In our efforts to provide the best possible care to you and others like you, can you think of anything that we could have done to help you after you left the hospital the first time, so that you might not have needed to return so soon? Other (Comment)  (Patient re-admitted for planned IOL)     Patient's previous admission was for r/o PreE w/ SFs. Her BPs and labs stable and was DC'd to home.  ReAdmitted for IOL @ 39w0d

## 2022-06-27 NOTE — PROGRESS NOTES
Labor Progress Note    Nat Fofana is a 25 y.o. female  at 36w0d  The patient was seen and examined. Her pain is intermittently well controlled with morphine. She reports fetal movement is present, denies contractions, denies loss of fluid, denies vaginal bleeding. Vital Signs:  Vitals:    22 1400 22 1534 22 1600 22 1700   BP: (!) 143/97 (!) 143/90 (!) 137/93 (!) 140/84   Pulse: (!) 103 97 (!) 102 97   Resp: 19 16 18 17   Temp:   98.1 °F (36.7 °C)    TempSrc:   Oral    SpO2:           FHT: 140 moderate variability, accelerations present, decelerations absent  Contractions: contractions q2-3min    Chaperone for Intimate Exam: Chaperone was present for entire exam, Chaperone Name: Radha Yanes RN   Cervical Exam: deferred  Pitocin: @ 7 mu/min    Membranes: Intact  Scalp Electrode in place: absent  Intrauterine Pressure Catheter in Place: absent    Assessment/Plan:  Nat Fofana is a 25 y.o. female  at 39w0d admitted for RR IOL    - GBS negative, No indication for GBS prophylaxis   - SVE: deferred   - S/p Cervidil x1   - Og balloon in place   - Low dose pitocin   - Continue current management     Attending updated and in agreement with plan.     Mary Lundy DO  Ob/Gyn Resident  2022, 5:13 PM

## 2022-06-27 NOTE — PROGRESS NOTES
Obstetric/Gynecology Resident Interval Note    Patient met criteria for gestational hypertension. PreE labs added on. Will continue to monitor.      Vitals:    06/27/22 0404 06/27/22 0800 06/27/22 1205 06/27/22 1400   BP: 133/72 (!) 112/58 (!) 145/76 (!) 143/97   Pulse: 94 99 (!) 105 (!) 103   Resp: 16 16 17 19   Temp: 98.3 °F (36.8 °C) 98.4 °F (36.9 °C) 99 °F (37.2 °C)    TempSrc: Oral Oral Oral    SpO2:  97%         Angle Sarmiento DO  OB/GYN Resident, PGY3  [facility] [place]  6/27/2022, 3:00 PM

## 2022-06-28 ENCOUNTER — ANESTHESIA EVENT (OUTPATIENT)
Dept: LABOR AND DELIVERY | Age: 22
End: 2022-06-28
Payer: COMMERCIAL

## 2022-06-28 ENCOUNTER — ANESTHESIA (OUTPATIENT)
Dept: LABOR AND DELIVERY | Age: 22
End: 2022-06-28
Payer: COMMERCIAL

## 2022-06-28 LAB
HCT VFR BLD CALC: 34.5 % (ref 36.3–47.1)
HEMOGLOBIN: 10.6 G/DL (ref 11.9–15.1)

## 2022-06-28 PROCEDURE — 36415 COLL VENOUS BLD VENIPUNCTURE: CPT

## 2022-06-28 PROCEDURE — 3E0P7VZ INTRODUCTION OF HORMONE INTO FEMALE REPRODUCTIVE, VIA NATURAL OR ARTIFICIAL OPENING: ICD-10-PCS | Performed by: OBSTETRICS & GYNECOLOGY

## 2022-06-28 PROCEDURE — 88307 TISSUE EXAM BY PATHOLOGIST: CPT

## 2022-06-28 PROCEDURE — 2580000003 HC RX 258: Performed by: STUDENT IN AN ORGANIZED HEALTH CARE EDUCATION/TRAINING PROGRAM

## 2022-06-28 PROCEDURE — 59400 OBSTETRICAL CARE: CPT | Performed by: OBSTETRICS & GYNECOLOGY

## 2022-06-28 PROCEDURE — 6360000002 HC RX W HCPCS: Performed by: NURSE ANESTHETIST, CERTIFIED REGISTERED

## 2022-06-28 PROCEDURE — 85018 HEMOGLOBIN: CPT

## 2022-06-28 PROCEDURE — 3700000025 EPIDURAL BLOCK: Performed by: ANESTHESIOLOGY

## 2022-06-28 PROCEDURE — 6370000000 HC RX 637 (ALT 250 FOR IP): Performed by: STUDENT IN AN ORGANIZED HEALTH CARE EDUCATION/TRAINING PROGRAM

## 2022-06-28 PROCEDURE — 2500000003 HC RX 250 WO HCPCS: Performed by: NURSE ANESTHETIST, CERTIFIED REGISTERED

## 2022-06-28 PROCEDURE — 10H07YZ INSERTION OF OTHER DEVICE INTO PRODUCTS OF CONCEPTION, VIA NATURAL OR ARTIFICIAL OPENING: ICD-10-PCS | Performed by: OBSTETRICS & GYNECOLOGY

## 2022-06-28 PROCEDURE — 7200000001 HC VAGINAL DELIVERY

## 2022-06-28 PROCEDURE — 1220000000 HC SEMI PRIVATE OB R&B

## 2022-06-28 PROCEDURE — 59050 FETAL MONITOR W/REPORT: CPT

## 2022-06-28 PROCEDURE — 10907ZC DRAINAGE OF AMNIOTIC FLUID, THERAPEUTIC FROM PRODUCTS OF CONCEPTION, VIA NATURAL OR ARTIFICIAL OPENING: ICD-10-PCS | Performed by: OBSTETRICS & GYNECOLOGY

## 2022-06-28 PROCEDURE — 96374 THER/PROPH/DIAG INJ IV PUSH: CPT

## 2022-06-28 PROCEDURE — 6360000002 HC RX W HCPCS: Performed by: STUDENT IN AN ORGANIZED HEALTH CARE EDUCATION/TRAINING PROGRAM

## 2022-06-28 PROCEDURE — 0KQM0ZZ REPAIR PERINEUM MUSCLE, OPEN APPROACH: ICD-10-PCS | Performed by: OBSTETRICS & GYNECOLOGY

## 2022-06-28 PROCEDURE — 85014 HEMATOCRIT: CPT

## 2022-06-28 PROCEDURE — 51701 INSERT BLADDER CATHETER: CPT

## 2022-06-28 RX ORDER — IBUPROFEN 800 MG/1
800 TABLET ORAL EVERY 8 HOURS PRN
Qty: 30 TABLET | Refills: 0 | Status: SHIPPED | OUTPATIENT
Start: 2022-06-28 | End: 2022-08-05

## 2022-06-28 RX ORDER — LIDOCAINE HYDROCHLORIDE AND EPINEPHRINE 15; 5 MG/ML; UG/ML
INJECTION, SOLUTION EPIDURAL PRN
Status: DISCONTINUED | OUTPATIENT
Start: 2022-06-28 | End: 2022-06-28 | Stop reason: SDUPTHER

## 2022-06-28 RX ORDER — DOCUSATE SODIUM 100 MG/1
100 CAPSULE, LIQUID FILLED ORAL 2 TIMES DAILY PRN
Qty: 60 CAPSULE | Refills: 1 | Status: SHIPPED | OUTPATIENT
Start: 2022-06-28 | End: 2022-07-28

## 2022-06-28 RX ORDER — SODIUM CHLORIDE 9 MG/ML
INJECTION, SOLUTION INTRAVENOUS PRN
Status: DISCONTINUED | OUTPATIENT
Start: 2022-06-28 | End: 2022-06-30 | Stop reason: HOSPADM

## 2022-06-28 RX ORDER — SODIUM CHLORIDE 0.9 % (FLUSH) 0.9 %
5-40 SYRINGE (ML) INJECTION EVERY 12 HOURS SCHEDULED
Status: DISCONTINUED | OUTPATIENT
Start: 2022-06-28 | End: 2022-06-30 | Stop reason: HOSPADM

## 2022-06-28 RX ORDER — ROPIVACAINE HYDROCHLORIDE 2 MG/ML
INJECTION, SOLUTION EPIDURAL; INFILTRATION; PERINEURAL PRN
Status: DISCONTINUED | OUTPATIENT
Start: 2022-06-28 | End: 2022-06-28 | Stop reason: SDUPTHER

## 2022-06-28 RX ORDER — LANOLIN 72 %
OINTMENT (GRAM) TOPICAL PRN
Status: DISCONTINUED | OUTPATIENT
Start: 2022-06-28 | End: 2022-06-30 | Stop reason: HOSPADM

## 2022-06-28 RX ORDER — ONDANSETRON 4 MG/1
4 TABLET, ORALLY DISINTEGRATING ORAL EVERY 4 HOURS PRN
Status: DISCONTINUED | OUTPATIENT
Start: 2022-06-28 | End: 2022-06-30 | Stop reason: HOSPADM

## 2022-06-28 RX ORDER — DOCUSATE SODIUM 100 MG/1
100 CAPSULE, LIQUID FILLED ORAL 2 TIMES DAILY
Status: DISCONTINUED | OUTPATIENT
Start: 2022-06-28 | End: 2022-06-30 | Stop reason: HOSPADM

## 2022-06-28 RX ORDER — IBUPROFEN 600 MG/1
600 TABLET ORAL EVERY 6 HOURS
Status: DISCONTINUED | OUTPATIENT
Start: 2022-06-28 | End: 2022-06-30 | Stop reason: HOSPADM

## 2022-06-28 RX ORDER — SIMETHICONE 80 MG
80 TABLET,CHEWABLE ORAL EVERY 6 HOURS PRN
Status: DISCONTINUED | OUTPATIENT
Start: 2022-06-28 | End: 2022-06-30 | Stop reason: HOSPADM

## 2022-06-28 RX ORDER — HYDROCORTISONE 25 MG/G
CREAM TOPICAL
Status: DISCONTINUED | OUTPATIENT
Start: 2022-06-28 | End: 2022-06-30 | Stop reason: HOSPADM

## 2022-06-28 RX ORDER — ROPIVACAINE HYDROCHLORIDE 2 MG/ML
INJECTION, SOLUTION EPIDURAL; INFILTRATION; PERINEURAL
Status: COMPLETED
Start: 2022-06-28 | End: 2022-06-28

## 2022-06-28 RX ORDER — SODIUM CHLORIDE 0.9 % (FLUSH) 0.9 %
5-40 SYRINGE (ML) INJECTION PRN
Status: DISCONTINUED | OUTPATIENT
Start: 2022-06-28 | End: 2022-06-30 | Stop reason: HOSPADM

## 2022-06-28 RX ADMIN — LIDOCAINE HYDROCHLORIDE,EPINEPHRINE BITARTRATE 3 ML: 15; .005 INJECTION, SOLUTION EPIDURAL; INFILTRATION; INTRACAUDAL; PERINEURAL at 01:46

## 2022-06-28 RX ADMIN — LIDOCAINE HYDROCHLORIDE,EPINEPHRINE BITARTRATE 2 ML: 15; .005 INJECTION, SOLUTION EPIDURAL; INFILTRATION; INTRACAUDAL; PERINEURAL at 01:50

## 2022-06-28 RX ADMIN — ROPIVACAINE HYDROCHLORIDE 8 ML: 2 INJECTION, SOLUTION EPIDURAL; INFILTRATION at 01:53

## 2022-06-28 RX ADMIN — DOCUSATE SODIUM 100 MG: 100 CAPSULE ORAL at 20:23

## 2022-06-28 RX ADMIN — SODIUM CHLORIDE, PRESERVATIVE FREE 10 ML: 5 INJECTION INTRAVENOUS at 22:00

## 2022-06-28 RX ADMIN — ACETAMINOPHEN 1000 MG: 500 TABLET ORAL at 20:23

## 2022-06-28 RX ADMIN — ROPIVACAINE HYDROCHLORIDE 10 ML/HR: 2 INJECTION, SOLUTION EPIDURAL; INFILTRATION at 01:54

## 2022-06-28 RX ADMIN — IBUPROFEN 600 MG: 600 TABLET, FILM COATED ORAL at 11:37

## 2022-06-28 RX ADMIN — DOCUSATE SODIUM 100 MG: 100 CAPSULE ORAL at 11:37

## 2022-06-28 RX ADMIN — Medication 166.7 ML: at 04:49

## 2022-06-28 RX ADMIN — SODIUM CHLORIDE, PRESERVATIVE FREE 10 ML: 5 INJECTION INTRAVENOUS at 11:37

## 2022-06-28 RX ADMIN — IBUPROFEN 600 MG: 600 TABLET, FILM COATED ORAL at 05:41

## 2022-06-28 RX ADMIN — IBUPROFEN 600 MG: 600 TABLET, FILM COATED ORAL at 16:52

## 2022-06-28 RX ADMIN — ONDANSETRON 4 MG: 2 INJECTION, SOLUTION INTRAMUSCULAR; INTRAVENOUS at 01:33

## 2022-06-28 ASSESSMENT — PAIN SCALES - GENERAL: PAINLEVEL_OUTOF10: 5

## 2022-06-28 ASSESSMENT — PAIN DESCRIPTION - ORIENTATION: ORIENTATION: MID

## 2022-06-28 ASSESSMENT — PAIN - FUNCTIONAL ASSESSMENT: PAIN_FUNCTIONAL_ASSESSMENT: ACTIVITIES ARE NOT PREVENTED

## 2022-06-28 ASSESSMENT — ENCOUNTER SYMPTOMS: SHORTNESS OF BREATH: 0

## 2022-06-28 ASSESSMENT — PAIN DESCRIPTION - DESCRIPTORS: DESCRIPTORS: ACHING;DISCOMFORT;CRAMPING

## 2022-06-28 NOTE — CARE COORDINATION
CASE MANAGEMENT POST-PARTUM TRANSITIONAL CARE PLAN    38 weeks gestation of pregnancy [Z3A.38]    OB Provider: Dr. Rivera Brown met w/ Linnea Helton at bedside to discuss DCP. She is S/P  on 2022 @ [id number] at 36w3d of male infant    Writer verified name/address/phone number correct on facesheet. She states she lives with her parents, 6 yr old sister and FOB/BF Beatrice Aponter. BCBS insurance correct. Writer notified Linnea Helton she needs to obtain insurance for [employer] w/in 30 days from date of birth as he cannot be added to a grandparents policy. She does not have any [facility] medicaid at this time. She verbalized Jim Narvaez was going to put him on his insurance through work. CM discussed how to apply for medicaid if she chooses this route. Brandi verbalized understanding. Linnea Helton confirmed a safe place for infant to sleep at home. Infant name on BC: Krystyna Ramirez. Infant PCP Unsure, List Provided.      DME: none  HOME CARE: none    Anticipate DC of couplet 2022    Readmission Risk              Risk of Unplanned Readmission:  6

## 2022-06-28 NOTE — ANESTHESIA PRE PROCEDURE
Department of Anesthesiology  Preprocedure Note       Name:  James Shearer   Age:  25 y.o.  :  2000                                          MRN:  [id number]         Date:  2022      Surgeon: * No surgeons listed *    Procedure: * No procedures listed *    Medications prior to admission:   Prior to Admission medications    Medication Sig Start Date End Date Taking?  Authorizing Provider   famotidine (PEPCID) 20 MG tablet Take 1 tablet by mouth 2 times daily 22   Amira Glass DO   Prenatal Vit-Fe Fumarate-FA (PRENATAL VITAMINS PO) Take 1 tablet by mouth daily 21   Historical Provider, MD       Current medications:    Current Facility-Administered Medications   Medication Dose Route Frequency Provider Last Rate Last Admin    ropivacaine (NAROPIN) 0.2% injection 0.2%             ropivacaine 0.2% in sodium chloride 0.9% (OB) epidural 100 mL  10 mL/hr Epidural Continuous Ashley Holden MD        oxytocin (PITOCIN) 30 units in 500 mL infusion  87.3 clay-units/min IntraVENous Continuous PRN Verlie Chroman, DO        And    oxytocin (PITOCIN) 10 unit bolus from the bag  10 Units IntraVENous PRN Verlie Chroman, DO        calcium carbonate (TUMS) chewable tablet 1,000 mg  1,000 mg Oral BID PRN Dianne Chan MD   1,000 mg at 22 1807    oxytocin (PITOCIN) 30 units in 500 mL infusion  1-20 clay-units/min IntraVENous Continuous Verlie Chroman, DO 14 mL/hr at 22 0020 14 clay-units/min at 22 0020    lactated ringers infusion   IntraVENous Continuous Verlie Chroman,  mL/hr at 22 1438 New Bag at 22 1438    lactated ringers bolus  500 mL IntraVENous PRN Verlie Chroman, DO        Or    lactated ringers bolus  1,000 mL IntraVENous PRN Verlie Chroman, DO        sodium chloride flush 0.9 % injection 5-40 mL  5-40 mL IntraVENous 2 times per day Verlie Chroman, DO        sodium chloride flush 0.9 % injection 5-40 mL  5-40 mL IntraVENous PRN Verlie Chroman, BMI:   Wt Readings from Last 3 Encounters:   06/21/22 228 lb (103.4 kg)   06/20/22 225 lb (102.1 kg)   06/15/22 223 lb (101.2 kg)     There is no height or weight on file to calculate BMI.    CBC:   Lab Results   Component Value Date    WBC 11.9 06/26/2022    RBC 4.03 06/26/2022    HGB 10.7 06/26/2022    HCT 34.4 06/26/2022    MCV 85.4 06/26/2022    RDW 13.8 06/26/2022     06/26/2022       CMP:   Lab Results   Component Value Date     06/27/2022    K 4.1 06/27/2022     06/27/2022    CO2 21 06/27/2022    BUN 7 06/27/2022    CREATININE 0.42 06/27/2022    GFRAA >60 06/27/2022    LABGLOM >60 06/27/2022    GLUCOSE 86 06/27/2022    PROT 6.3 06/27/2022    CALCIUM 8.5 06/27/2022    BILITOT 0.28 06/27/2022    ALKPHOS 147 06/27/2022    AST 11 06/27/2022    ALT 7 06/27/2022       POC Tests: No results for input(s): POCGLU, POCNA, POCK, POCCL, POCBUN, POCHEMO, POCHCT in the last 72 hours.     Coags:   Lab Results   Component Value Date    PROTIME 9.7 06/26/2022    INR 0.9 06/26/2022    APTT 23.4 06/26/2022       HCG (If Applicable):   Lab Results   Component Value Date    PREGTESTUR NEGATIVE 11/16/2019    HCG NEGATIVE 06/05/2019    HCGQUANT 44,023 (H) 11/17/2021        ABGs: No results found for: PHART, PO2ART, SNP1BBO, DJL0QYG, BEART, F3BJEVUT     Type & Screen (If Applicable):  No results found for: LABABO, LABRH    Drug/Infectious Status (If Applicable):  No results found for: HIV, HEPCAB    COVID-19 Screening (If Applicable): No results found for: COVID19        Anesthesia Evaluation  Patient summary reviewed and Nursing notes reviewed no history of anesthetic complications:   Airway: Mallampati: II  TM distance: >3 FB   Neck ROM: full  Mouth opening: > = 3 FB   Dental:          Pulmonary:normal exam        (-) pneumonia, COPD, asthma, shortness of breath, recent URI and sleep apnea                          ROS comment: Former smoker   Cardiovascular:  Exercise tolerance: good (>4 METS),   (+) hypertension:,     (-) pacemaker, valvular problems/murmurs, past MI, CAD, CABG/stent, dysrhythmias,  angina,  CHF, orthopnea, PND,  JACQUES, no pulmonary hypertension and no hyperlipidemia                Neuro/Psych:   (+) depression/anxiety    (-) seizures, neuromuscular disease, TIA, CVA, headaches and psychiatric history           GI/Hepatic/Renal:   (+) morbid obesity     (-) hiatal hernia, GERD, PUD, hepatitis, liver disease, no renal disease and bowel prep       Endo/Other:    (+) no malignancy/cancer. (-) diabetes mellitus, hypothyroidism, hyperthyroidism, blood dyscrasia, arthritis, no electrolyte abnormalities, no malignancy/cancer               Abdominal:       Abdomen: tender. Vascular: Other Findings:           Anesthesia Plan      epidural     ASA 3             Anesthetic plan and risks discussed with patient.               Post-op pain plan if not by surgeon: continuous epidural            LIV Long - [facility]   6/28/2022

## 2022-06-28 NOTE — PROGRESS NOTES
Initiation of Electric Breast Pumping     Pumping Initiated at [employer]    Initiated due to    []   Baby in NICU   [x]   Plans exclusive pumping   []   Infant weight loss(supplement)   []   Baby not latching well    Flange Size    Right:   Left:     [x]   24    [x]   24     []   27    []   27     []   30    []   30     []   36    []   36  Instructions   [x]   Verbal instructions on how to setup pump and how to use initiation phase   []   Written sheet\" How to keep your breast pump kit clean\"   [x]   Expectation sheet for Breastfeeding mothers with pumping log   [x]   Frequency of pumping   [x]   Collection,labeling and storage of colostrum and milk    Supplies Provided   [x]   Pump initiation kit   [x]   Cleaning supplies (basin and soap)   [x]   Additional flange size   [x]   Oral syringes/snappies   []   Patient labels       -

## 2022-06-28 NOTE — PROGRESS NOTES
Patient admitted to room 748. Oriented to room and surroundings. Plan of care reviewed. Verbalized understanding. Instructed on infant security and safe sleep practices. Preventing falls education provided . The following handouts given: A New Beginning: Your Guide to Postpartum Care, Rounding, gs Security System,Babies Cry A lot, Safe Sleep, Security and Visitation Guidelines. Call light placed within reach.

## 2022-06-28 NOTE — PROGRESS NOTES
Patient Name: Regina Jackson  Patient : 2000  Room/Bed: [facility]0710-  Admission Date/Time: 2022 10:00 PM  MRN #: [id number]  [facility] #: [id number]    Date: 2022  Time: 12:10 AM        Regina Jackson is a 25 y.o. female  OB History    Para Term  AB Living   1 0 0 0 0 0   SAB IAB Ectopic Molar Multiple Live Births   0 0 0 0 0 0      # Outcome Date GA Lbr Daniel/2nd Weight Sex Delivery Anes PTL Lv   1 Current                Gestational Age:  36w3d      The patient was seen and examined. The details of her pelvic exam can be found in the EPIC flow section of her EMR. Her pain  is well controlled . The baby is moving well. Tracing Review (Date of Tracing): There Is moderate Variability  Vitals:    22 2201 22 2232 22 2301 22 0000   BP: 130/68  (!) 141/79 (!) 143/78   Pulse: (!) 107  (!) 105 (!) 114   Resp: 16  16 16   Temp:  98.1 °F (36.7 °C)     TempSrc:  Oral     SpO2:         FHT's are 130  The tracing is Category 1 .     Intervention:  None      Membranes Are: Ruptured clear fluid  Scalp Electrode in place: No  Intrauterine Pressure Catheter in Place: No          4 Week Lab Review:  Admission on 2022   Component Date Value Ref Range Status    Expiration Date 2022,2359   Final    Arm Band Number 2022 BE [id number]   Final    ABO/Rh 2022 B POSITIVE   Final    Antibody Screen 2022 NEGATIVE   Final    Amphetamine Screen, Ur 2022 NEGATIVE  NEGATIVE Final    Comment:       (Positive cutoff 1000 ng/mL)                  Barbiturate Screen, Ur 2022 NEGATIVE  NEGATIVE Final    Comment:       (Positive cutoff 200 ng/mL)                  Benzodiazepine Screen, Urine 2022 NEGATIVE  NEGATIVE Final    Comment:       (Positive cutoff 200 ng/mL)                  Cocaine Metabolite, Urine 2022 NEGATIVE  NEGATIVE Final    Comment:       (Positive cutoff 300 ng/mL)                  Methadone Screen, Urine 06/27/2022 NEGATIVE  NEGATIVE Final    Comment:       (Positive cutoff 300 ng/mL)                  Opiates, Urine 06/27/2022 NEGATIVE  NEGATIVE Final    Comment:       (Positive cutoff 300 ng/mL)                  Phencyclidine, Urine 06/27/2022 NEGATIVE  NEGATIVE Final    Comment:       (Positive cutoff 25 ng/mL)                  Cannabinoid Scrn, Ur 06/27/2022 NEGATIVE  NEGATIVE Final    Comment:       (Positive cutoff 50 ng/mL)                  Oxycodone Screen, Ur 06/27/2022 NEGATIVE  NEGATIVE Final    Comment:       (Positive cutoff 100 ng/mL)                  Test Information 06/27/2022 Assay provides medical screening only. The absence of expected drug(s) and/or metabolite(s) may indicate diluted or adulterated urine, limitations of testing or timing of collection. Final    Comment: Testing for legal purposes should be confirmed by another method. To request confirmation   of test result, please call the lab within 7 days of sample submission.  T. pallidum, IgG 06/26/2022 NONREACTIVE  NONREACTIVE Final    Comment:       T. pallidum antibodies are not detected. There is no serological evidence of infection with T. pallidum (early primary syphilis   cannot be excluded). Retest in 2-4 weeks if syphilis is clinically suspect.             WBC 06/26/2022 11.9* 3.5 - 11.3 k/uL Final    RBC 06/26/2022 4.03  3.95 - 5.11 m/uL Final    Hemoglobin 06/26/2022 10.7* 11.9 - 15.1 g/dL Final    Hematocrit 06/26/2022 34.4* 36.3 - 47.1 % Final    MCV 06/26/2022 85.4  82.6 - 102.9 fL Final    MCH 06/26/2022 26.6  25.2 - 33.5 pg Final    MCHC 06/26/2022 31.1  28.4 - 34.8 g/dL Final    RDW 06/26/2022 13.8  11.8 - 14.4 % Final    Platelets 53/37/4180 192  138 - 453 k/uL Final    MPV 06/26/2022 11.6  8.1 - 13.5 fL Final    NRBC Automated 06/26/2022 0.2* 0.0 per 100 WBC Final    Seg Neutrophils 06/26/2022 74* 36 - 65 % Final    Lymphocytes 06/26/2022 17* 24 - 43 % Final    Monocytes 06/26/2022 8  3 - 12 % Final    Eosinophils % 06/26/2022 1  1 - 4 % Final    Basophils 06/26/2022 0  0 - 2 % Final    Immature Granulocytes 06/26/2022 0  0 % Final    Segs Absolute 06/26/2022 8.87* 1.50 - 8.10 k/uL Final    Absolute Lymph # 06/26/2022 2.01  1.10 - 3.70 k/uL Final    Absolute Mono # 06/26/2022 0.90  0.10 - 1.20 k/uL Final    Absolute Eos # 06/26/2022 0.07  0.00 - 0.44 k/uL Final    Basophils Absolute 06/26/2022 0.03  0.00 - 0.20 k/uL Final    Absolute Immature Granulocyte 06/26/2022 0.05  0.00 - 0.30 k/uL Final    Protime 06/26/2022 9.7  9.1 - 12.3 sec Final    INR 06/26/2022 0.9   Final    Comment:       Therapeutic Range: Moderate Anticoagulant Intensity:     INR = 2.0-3.0   High Anticoagulant Intensity:     INR = 2.5-3.5            PTT 06/26/2022 23.4  20.5 - 30.5 sec Final    Comment:       IV Heparin Therapy Range:  48.6-77.8      Total Protein, Urine 06/27/2022 22  mg/dL Final    No normal range established.  Creatinine, Ur 06/27/2022 209.5  28.0 - 217.0 mg/dL Final    Urine Total Protein Creatinine Rat* 06/27/2022 0.11  0.00 - 0.20 Final    Specimen Source 06/26/2022 . BLOOD   Final   [facility] Ordered Test 06/26/2022 CP   Final    Reason for Rejection 06/26/2022 Unable to perform testing: Specimen hemolyzed.    Final    Glucose 06/27/2022 86  70 - 99 mg/dL Final    BUN 06/27/2022 7  6 - 20 mg/dL Final    CREATININE 06/27/2022 0.42* 0.50 - 0.90 mg/dL Final    Calcium 06/27/2022 8.5* 8.6 - 10.4 mg/dL Final    Sodium 06/27/2022 136  135 - 144 mmol/L Final    Potassium 06/27/2022 4.1  3.7 - 5.3 mmol/L Final    Chloride 06/27/2022 102  98 - 107 mmol/L Final    CO2 06/27/2022 21  20 - 31 mmol/L Final    Anion Gap 06/27/2022 13  9 - 17 mmol/L Final    Alkaline Phosphatase 06/27/2022 147* 35 - 104 U/L Final    ALT 06/27/2022 7  5 - 33 U/L Final    AST 06/27/2022 11  <32 U/L Final    Total Bilirubin 06/27/2022 0.28* 0.3 - 1.2 mg/dL Final    Total Protein 06/27/2022 6.3* 6.4 - 8.3 g/dL Final    Final    CREATININE 06/20/2022 0.38* 0.50 - 0.90 mg/dL Final    Calcium 06/20/2022 8.6  8.6 - 10.4 mg/dL Final    Sodium 06/20/2022 132* 135 - 144 mmol/L Final    Potassium 06/20/2022 4.1  3.7 - 5.3 mmol/L Final    Chloride 06/20/2022 103  98 - 107 mmol/L Final    CO2 06/20/2022 20  20 - 31 mmol/L Final    Anion Gap 06/20/2022 9  9 - 17 mmol/L Final    Alkaline Phosphatase 06/20/2022 135* 35 - 104 U/L Final    ALT 06/20/2022 9  5 - 33 U/L Final    AST 06/20/2022 14  <32 U/L Final    Total Bilirubin 06/20/2022 0.23* 0.3 - 1.2 mg/dL Final    Total Protein 06/20/2022 6.3* 6.4 - 8.3 g/dL Final    Albumin 06/20/2022 3.2* 3.5 - 5.2 g/dL Final    Albumin/Globulin Ratio 06/20/2022 1.0  1.0 - 2.5 Final    GFR Non- 06/20/2022 >60  >60 mL/min Final    GFR  06/20/2022 >60  >60 mL/min Final    GFR Comment 06/20/2022        Final    Comment: Average GFR for 20-28 years old:   116 mL/min/1.73sq m  Chronic Kidney Disease:   <60 mL/min/1.73sq m  Kidney failure:   <15 mL/min/1.73sq m              eGFR calculated using average adult body mass. Additional eGFR calculator available at:        [email or web address]            Total Protein, Urine 06/20/2022 16  mg/dL Final    No normal range established.     Creatinine, Ur 06/20/2022 124.8  28.0 - 217.0 mg/dL Final    Urine Total Protein Creatinine Rat* 06/20/2022 0.13  0.00 - 0.20 Final   Admission on 06/19/2022, Discharged on 06/19/2022   Component Date Value Ref Range Status    Color, UA 06/19/2022 Yellow  Yellow Final    Turbidity UA 06/19/2022 Cloudy* Clear Final    Glucose, Ur 06/19/2022 NEGATIVE  NEGATIVE Final    Bilirubin Urine 06/19/2022 NEGATIVE  NEGATIVE Final    Ketones, Urine 06/19/2022 NEGATIVE  NEGATIVE Final    Specific [place], UA 06/19/2022 1.011  1.000 - 1.030 Final    Urine Hgb 06/19/2022 NEGATIVE  NEGATIVE Final    pH, UA 06/19/2022 7.0  5.0 - 8.0 Final    Protein, UA 06/19/2022 NEGATIVE  NEGATIVE Final    Urobilinogen, Urine 2022 Normal  Normal Final    Nitrite, Urine 2022 NEGATIVE  NEGATIVE Final    Leukocyte Esterase, Urine 2022 MOD* NEGATIVE Final    WBC, UA 2022 21 TO 50  /HPF Final    RBC, UA 2022 0 TO 2  /HPF Final    Casts UA 2022 0 TO 2  /LPF Final    Epithelial Cells UA 2022 21 TO 50  /HPF Final    Bacteria, UA 2022 FEW* None Final    Specimen Description 2022 . CLEAN CATCH URINE   Final    Culture 2022 NO SIGNIFICANT GROWTH   Final   ]    BP (!) 143/78   Pulse (!) 114   Temp 98.1 °F (36.7 °C) (Oral)   Resp 16   LMP  (Approximate)   SpO2 97%       Pelvic Exam:  Cervix Check:     DILATION:  4 cm   EFFACEMENT:   70%   STATION:  -1 cm   CONSISTENCY:  soft   POSITION:  mid    FETAL POSITION: Cephalic    Assessment:  1. Antonio Scale is a 25 y.o. female  36w3d     2.   OB History    Para Term  AB Living   1             SAB IAB Ectopic Molar Multiple Live Births                    # Outcome Date GA Lbr Daniel/2nd Weight Sex Delivery Anes PTL Lv   1 Current                  3. 38 weeks gestation of pregnancy [Z3A.38]      4. Patient Active Problem List    Diagnosis Date Noted    Low-lying placenta (resolved) 2022     Priority: High     Pelvic rest/ no heavy lifting       38 weeks gestation of pregnancy 2022     Priority: Medium    High-risk pregnancy in third trimester 2022     Priority: Medium    gHTN  2022     Dx during labor      Family history of diabetes mellitus 2022     Early 1hr GTT      Elevated Bp x 1  2022: 154/88 at MFM  Pre E labs wnl, P/C 0.13      Circumvallate Placenta 2022       5. IOL d/t elevated BPs          Plan:   1. Continue with current care plan  2.  Pitocin per protocol        Electronically signed by Viry Moncada DO on 2022 at 12:10 AM

## 2022-06-28 NOTE — L&D DELIVERY NOTE
Mother's Information    Labor Events     Labor?: No  Cervical Ripening:   Now         Abdulaziz Fraga Headings [[id number]]    Labor Events     Labor?: No   Steroids?: None  Cervical Ripening Date/Time:     Cervical Ripening Type: Cervidil, Og/EASI  Antibiotics Received during Labor?: No  Rupture Identifier: Sac 1   Rupture Date/Time: 22 22:34:00   Rupture Type: AROM  Fluid Color: Clear  Fluid Odor: None  Fluid Volume: Moderate  Induction: Cervidil, Og Bulb (Balloon)  Augmentation: AROM, Oxytocin  Labor Complications: None     Anesthesia    Method: Epidural     Start Pushing    Labor onset date/time:   Now   Dilation complete date/time: 22 04:05:00 EDT Now   Start pushing date/time:    Decision date/time (emergent ):       Delivery ([place])    Delivery Date/Time:  22 04:45:00   Delivery Type: Vaginal, Spontaneous    Details:         Presentation    Presentation: Vertex  Position: Left  _: Occiput  _: Anterior     Shoulder Dystocia    Shoulder Dystocia Present?: No  Add Second Maneuver  Add Third Maneuver  Add Fourth Maneuver  Add Fifth Maneuver  Add Sixth Maneuver  Add Seventh Maneuver  Add Eighth Maneuver  Add Ninth Maneuver     Assisted Delivery Details    Forceps Attempted?: No  Vacuum Extractor Attempted?: No     Document Additional Attempt       Document Additional Attempt             Cord    Vessels: 3 Vessels  Complications: Nuchal Loose  Cord Around: Head  Delayed Cord Clamping?: Yes  Cord Clamped Date/Time: 2022 04:46:00  Cord Blood Disposition: Lab  Gases Sent?: Yes     Placenta    Date/Time: 2022 04:49:00  Removal: Spontaneous  Appearance: Intact  Disposition: Lab, Pathology     Lacerations    Episiotomy: None  Perineal Lacerations: 2nd  Other Lacerations: no non-perineal laceration  Number of Repair Packets: 1     Vaginal Counts    Initial Count Personnel: DOTTIE Tejada [id number] RN  Initial Count Verified By: DR MICHAEL Valdovinos [employer]   Initial Counts Correct  Correct   Final Counts Correct Correct Correct   Final Count Personnel: DR Nida Carmona  Final Count Verified By:  [facility]  Accurate Final Count?: Yes  If the count is incorrect due to Intentionally Retained Foreign Object (IRFO) add the IRFO LDA in Lines/Drains. Add LDA: Link to [facility]     Blood Loss  Mother: Bere Mansfield #[id number]   Start of Mother's Information    Delivery Blood Loss  22 1645 - 22 0504    None           End of Mother's Information  Mother: Bere Mansfield #[id number]          Delivery Providers    Delivering clinician: Cheryl Bhatti DO     Provider Role    Cheryl Bhatti DO Obstetrician     Primary Nurse     Primary  Nurse     NICU Nurse     Neonatologist     Anesthesiologist     Nurse Anesthetist     Nurse Practitioner     Midwife     Nursery Nurse    Juan Caputo DO Chief Resident    Yaniv Mulligan, MD Resident           Assessment    Living Status: Living     Apgar Scoring Key:    0 1 2    Skin Color: Blue or pale Acrocyanotic Completely pink    Heart Rate: Absent <100 bpm >100 bpm    Reflex Irritability: No response Grimace Cry or active withdrawal    Muscle Tone: Limp Some flexion Active motion    Respiratory Effort: Absent Weak cry; hypoventilation Good, crying                  Skin Color:   Heart Rate:   Reflex Irritability:   Muscle Tone:   Respiratory Effort:    Total:            1 Minute:    0    2    2    2    2    8        Apgar 1 total from OB History    5 Minute:    1    2    2    2    2    9        Apgar 5 total from OB History    10 Minute:              15 Minute:              20 Minute:                             Resuscitation    Method: Bulb Suction, Stimulation           [place] Measurements    Birth Weight: 3300 g Birth Length: 0.533 m          Title    Skin to Skin Initiation Date/Time:     Skin to Skin End Date/Time:                  Department of Obstetrics and Gynecology  Spontaneous Vaginal Delivery Note        Patient Name: Wendy Rodriguez  Patient : 2000  Room/Bed: [facility]10-  Admission Date/Time: 2022 10:00 PM  MRN #: [id number]  [facility] #: [id number]          Date: 2022  Time: 5:04 AM    Pre-operative Diagnosis:   Wendy Rodriguez is a 25 y.o. female at 36w3d    Term pregnancy, Induced labor, Single fetus and Pregnancy complicated by: see problem list  Patient Active Problem List    Diagnosis Date Noted    Low-lying placenta (resolved) 2022     Priority: High     Overview Note:     Pelvic rest/ no heavy lifting        22 M Apg ** Wt 7#4 2022     Priority: Medium    38 weeks gestation of pregnancy 2022     Priority: Medium    High-risk pregnancy in third trimester 2022     Priority: Medium    gHTN  2022     Overview Note:     Dx during labor      Family history of diabetes mellitus 2022     Overview Note:     Early 1hr GTT      Elevated Bp x 1  2022     Overview Note:     22: 154/88 at M  Pre E labs wnl, P/C 0.13      Circumvallate Placenta 2022     Gestational HTN        Post-operative Diagnosis:    Living  infant(s) and Male  Same as Pre-Op      Anesthesia:  epidural anesthesia    EBL: see QBL       Findings Summary:  Delivery Summary:  Mother's Information    Labor Events     Labor?: No  Cervical Ripening:   Now         Rafa More Boy Perkins Score [[id number]]    Labor Events     Labor?: No   Steroids?: None  Cervical Ripening Date/Time:     Cervical Ripening Type: Cervidil, Og/EASI  Antibiotics Received during Labor?: No  Rupture Identifier: Sac 1   Rupture Date/Time: 22 22:34:00   Rupture Type: AROM  Fluid Color: Clear  Fluid Odor: None  Fluid Volume:  Moderate  Induction: Cervidil, Og Bulb (Balloon)  Augmentation: AROM, Oxytocin  Labor Complications: None     Anesthesia    Method: Epidural     Start Pushing    Labor onset date/time:   Now   Dilation complete date/time: 22 04:05:00 EDT Now   Start pushing date/time:    Decision date/time (emergent ):       Delivery ([place])    Delivery Date/Time:  22 04:45:00   Delivery Type: Vaginal, Spontaneous    Details:         Presentation    Presentation: Vertex  Position: Left  _: Occiput  _: Anterior     Shoulder Dystocia    Shoulder Dystocia Present?: No  Add Second Maneuver  Add Third Maneuver  Add Fourth Maneuver  Add Fifth Maneuver  Add Sixth Maneuver  Add Seventh Maneuver  Add Eighth Maneuver  Add Ninth Maneuver     Assisted Delivery Details    Forceps Attempted?: No  Vacuum Extractor Attempted?: No     Document Additional Attempt       Document Additional Attempt             Cord    Vessels: 3 Vessels  Complications: Nuchal Loose  Cord Around: Head  Delayed Cord Clamping?: Yes  Cord Clamped Date/Time: 2022 04:46:00  Cord Blood Disposition: Lab  Gases Sent?: Yes     Placenta    Date/Time: 2022 04:49:00  Removal: Spontaneous  Appearance: Intact  Disposition: Lab, Pathology     Lacerations    Episiotomy: None  Perineal Lacerations: 2nd  Other Lacerations: no non-perineal laceration  Number of Repair Packets: 1     Vaginal Counts    Initial Count Personnel: DOTTIE Yuan RN  Initial Count Verified By: DR RODRIGUEZ    Sponges Needles Instruments   Initial Counts Correct  Correct   Final Counts Correct Correct Correct   Final Count Personnel: DR Lizz George  Final Count Verified By:  [facility]  Accurate Final Count?: Yes  If the count is incorrect due to Intentionally Retained Foreign Object (IRFO) add the IRFO LDA in Lines/Drains.   Add LDA: Link to [facility]     Blood Loss  Mother: Mainor Abler #[id number]   Start of Mother's Information    Delivery Blood Loss  22 1645 - 22 0504    None           End of Mother's Information  Mother: Mainor Abler #[id number]          Delivery Providers    Delivering clinician: Nick Coyle, DO     Provider Role    Nick May, DO Obstetrician     Primary Nurse     Primary [place] Nurse     NICU Nurse     Neonatologist     Anesthesiologist     Nurse Anesthetist     Nurse Practitioner     Midwife     Nursery Nurse    Princess Painting DO Chief Resident    Felipe Mcdaniel MD Resident           Assessment    Living Status: Living     Apgar Scoring Key:    0 1 2    Skin Color: Blue or pale Acrocyanotic Completely pink    Heart Rate: Absent <100 bpm >100 bpm    Reflex Irritability: No response Grimace Cry or active withdrawal    Muscle Tone: Limp Some flexion Active motion    Respiratory Effort: Absent Weak cry; hypoventilation Good, crying                  Skin Color:   Heart Rate:   Reflex Irritability:   Muscle Tone:   Respiratory Effort: Total:            1 Minute:    0    2    2    2    2    8        Apgar 1 total from OB History    5 Minute:    1    2    2    2    2    9        Apgar 5 total from OB History    10 Minute:              15 Minute:              20 Minute:                             Resuscitation    Method: Bulb Suction, Stimulation           [place] Measurements    Birth Weight: 3300 g Birth Length: 0.533 m          Title    Skin to Skin Initiation Date/Time:     Skin to Skin End Date/Time:                  Living  infant(s) and Male    Cephalic  left occiput anterior  Other:       Amniotic Fluid was: Clear  A Nuchal Cord: was present and reduced x 1  A Spontaneous Cry Was Noted: Yes  The Baby: was suctioned        The Placenta Was Removed:  intact, whole and that the umbilical cord had three vessels noted    cord gasses were obtained and sent to the lab, cord blood was obtained and sent to the lab, Pitocin, 20 milliunits in 1 liter of ringers lactate was administered, wide open, to assist with uterine contraction and vaginal lacerations were repaired    The umbilical cord had delayed clamping of 1 minute: Yes      Episiotomy: (none)  Second degree laceration. Suture used for repair:  Vicryl 3.0.     The Cervix Vagina & Rectum were inspected after the repair and found to be intact without any defects. Good sphincter tone was present. Yes      Condition:  infant stable to general nursery and mother stable    Blood Type and Rh:   ABO/Rh   Date Value Ref Range Status   06/26/2022 B POSITIVE  Final         Rubella Immunity Status:     Rubella Antibody, IGG   Date Value Ref Range Status   01/05/2022 299.2 IU/mL Final     Comment:                 REFERENCE RANGE:  <5.0       NON-REACTIVE (non-immune)  5.0 TO 9.9 EQUIVOCAL  >=10.0     REACTIVE     (immune)                     Infant Feeding:    bottle     Circumcision Requested: Yes      Attending Attestation: I was present and scrubbed for the entire procedure. A Vaginal Vault Sweep Was Completed-All Sponge Counts Were Correct: Yes          Resident Name: GUILLE Watters D.O. PGY1  KAHLIL Ziegler PGY3    Attending Name: Eleno Coleman DO      Electronically signed by Eleno Colemna DO on 6/28/2022 at 5:04 AM

## 2022-06-28 NOTE — PROGRESS NOTES
Labor Progress Note    Osvaldo Skiff is a 25 y.o. female  at 36w0d  The patient was seen and examined. Her pain is well controlled. She reports fetal movement is present, complains of contractions, denies loss of fluid, denies vaginal bleeding. Vital Signs:  Vitals:    22 1912 22 2101 22 2201   BP: (!) 141/86 (!) 148/93 (!) 144/77 130/68   Pulse: (!) 113 (!) 102 (!) 102 (!) 107   Resp: 16 16 16 16   Temp:   98.4 °F (36.9 °C)    TempSrc:   Oral    SpO2:         FHT: 135, moderate variability, accelerations present, decelerations absent  Contractions: irregular, every 2-4 minutes    Chaperone for Intimate Exam: Chaperone was present for entire exam, Chaperone Name: Rubin Mancini RN  Cervical Exam: 4 cm dilated, 50 effaced, 0 station  Pitocin: @ 12 mu/min    Membranes: Ruptured clear fluid  Scalp Electrode in place: absent  Intrauterine Pressure Catheter in Place: absent    Interventions: SVE, AROM performed without difficulty    Assessment/Plan:  Osvaldo Skiff is a 25 y.o. female  at 39w0d admitted for RR IOL   - GBS negative, No indication for GBS prophylaxis   - Afebrile   - cEFM/TOCO   - AROM (clr) @ 2230   - S/p cervidil x1   - S/p grace   - Continue pitocin per protocol   - S/p morphine/compazine x1   - Patient desires an epidural at some point.  Epidural ordered   - Continue to monitor closely     Attending updated and in agreement with plan    Marcy Espinosa MD  Ob/Gyn Resident  2022, 10:41 PM

## 2022-06-28 NOTE — PROGRESS NOTES
Labor Progress Note    Radha Adamson is a 25 y.o. female  at 36w3d  The patient was seen and examined. Her pain is well controlled with her epidural. She reports fetal movement is present, complains of contractions, complains of loss of fluid, denies vaginal bleeding.        Vital Signs:  Vitals:    22 0200 22 0201 22 0206 22 0210   BP:  137/75 121/79    Pulse:  (!) 106 (!) 105    Resp:  16 16    Temp:       TempSrc:       SpO2: 96%   95%     FHT: 145, moderate variability, accelerations present, intermittent variable/late decelerations   Contractions: irregular, every 2-4 minutes    Chaperone for Intimate Exam: Chaperone was present for entire exam, Chaperone Name: Manoj Nuñez RN  Cervical Exam: 4 cm dilated, 70 effaced, 0 station  Pitocin: @ 14 mu/min    Membranes: Ruptured clear fluid  Scalp Electrode in place: absent  Intrauterine Pressure Catheter in Place: present    Interventions: SVE, IUPC placed without difficulty     Assessment/Plan:  Radha Adamson is a 25 y.o. female  at 36w3d admitted for RR IOL   - GBS negative, No indication for GBS prophylaxis   - Afebrile   - cEFM/TOCO              - AROM (clr) @ 2230   - IUPC in place to further characterize decels and monitor strength of contractions              - S/p cervidil x1              - S/p grace              - Continue pitocin per protocol              - S/p morphine/compazine x1   - Continue to monitor closely     Senior resident updated and in agreement with plan    Henna Soni MD  Ob/Gyn Resident  2022, 2:47 AM

## 2022-06-28 NOTE — ANESTHESIA PROCEDURE NOTES
Epidural Block    Patient location during procedure: OB  Reason for block: labor epidural  Staffing  Resident/CRNA: LIV Long CRNA  Epidural  Patient position: sitting  Prep: Betadine  Patient monitoring: frequent blood pressure checks and continuous pulse ox  Approach: midline  Location: L4-5  Injection technique: LAUREN saline  Guidance: paresthesia technique  Provider prep: sterile gloves and mask  Needle  Needle type: Tuohy   Needle gauge: 17 G  Needle length: 3.5 in  Needle insertion depth: 8 cm  Catheter type: side hole  Catheter size: 19 G  Catheter at skin depth: 14 cm  Test dose: negativeCatheter Secured: tegaderm and tape  Assessment  Sensory level: T10  Hemodynamics: stable  Attempts: 1  Outcomes: uncomplicated and patient tolerated procedure well  Preanesthetic Checklist  Completed: patient identified, IV checked, site marked, risks and benefits discussed, surgical/procedural consents, equipment checked, pre-op evaluation, timeout performed, anesthesia consent given, oxygen available, monitors applied/VS acknowledged, fire risk safety assessment completed and verbalized and blood product R/B/A discussed and consented

## 2022-06-28 NOTE — ANESTHESIA POSTPROCEDURE EVALUATION
Department of Anesthesiology  Postprocedure Note    Patient: Arlyn Lazcano  MRN: [id number]  YOB: 2000  Date of evaluation: 6/28/2022      Procedure Summary     Date: 06/28/22 Room / Location:     Anesthesia Start: 0130 Anesthesia Stop: 6264    Procedure: Labor Analgesia Diagnosis:     Scheduled Providers:  Responsible Provider: Taylor rGant MD    Anesthesia Type: epidural ASA Status: 3          Anesthesia Type: No value filed.     Citlali Phase I: Citlali Score: 10    Citlali Phase II:        Anesthesia Post Evaluation    Patient location during evaluation: floor  Patient participation: complete - patient participated  Level of consciousness: awake and alert  Pain score: 2  Airway patency: patent  Nausea & Vomiting: no nausea and no vomiting  Complications: no  Cardiovascular status: blood pressure returned to baseline and hemodynamically stable  Respiratory status: room air and acceptable  Hydration status: stable  Multimodal analgesia pain management approach

## 2022-06-29 LAB — SURGICAL PATHOLOGY REPORT: NORMAL

## 2022-06-29 PROCEDURE — 6370000000 HC RX 637 (ALT 250 FOR IP): Performed by: STUDENT IN AN ORGANIZED HEALTH CARE EDUCATION/TRAINING PROGRAM

## 2022-06-29 PROCEDURE — 1220000000 HC SEMI PRIVATE OB R&B

## 2022-06-29 PROCEDURE — 2580000003 HC RX 258: Performed by: STUDENT IN AN ORGANIZED HEALTH CARE EDUCATION/TRAINING PROGRAM

## 2022-06-29 RX ADMIN — DOCUSATE SODIUM 100 MG: 100 CAPSULE ORAL at 07:51

## 2022-06-29 RX ADMIN — ACETAMINOPHEN 1000 MG: 500 TABLET ORAL at 07:10

## 2022-06-29 RX ADMIN — IBUPROFEN 600 MG: 600 TABLET, FILM COATED ORAL at 00:23

## 2022-06-29 RX ADMIN — ACETAMINOPHEN 1000 MG: 500 TABLET ORAL at 20:56

## 2022-06-29 RX ADMIN — IBUPROFEN 600 MG: 600 TABLET, FILM COATED ORAL at 13:53

## 2022-06-29 RX ADMIN — IBUPROFEN 600 MG: 600 TABLET, FILM COATED ORAL at 07:10

## 2022-06-29 RX ADMIN — DOCUSATE SODIUM 100 MG: 100 CAPSULE ORAL at 20:56

## 2022-06-29 RX ADMIN — SODIUM CHLORIDE, PRESERVATIVE FREE 10 ML: 5 INJECTION INTRAVENOUS at 07:51

## 2022-06-29 RX ADMIN — ACETAMINOPHEN 1000 MG: 500 TABLET ORAL at 13:53

## 2022-06-29 RX ADMIN — WITCH HAZEL 40 EACH: 500 SOLUTION RECTAL; TOPICAL at 21:00

## 2022-06-29 RX ADMIN — IBUPROFEN 600 MG: 600 TABLET, FILM COATED ORAL at 20:56

## 2022-06-29 ASSESSMENT — PAIN - FUNCTIONAL ASSESSMENT
PAIN_FUNCTIONAL_ASSESSMENT: ACTIVITIES ARE NOT PREVENTED

## 2022-06-29 ASSESSMENT — PAIN DESCRIPTION - DESCRIPTORS
DESCRIPTORS: ACHING;SORE
DESCRIPTORS: ACHING;DISCOMFORT
DESCRIPTORS: DISCOMFORT
DESCRIPTORS: ACHING;SORE;DISCOMFORT
DESCRIPTORS: DISCOMFORT

## 2022-06-29 ASSESSMENT — PAIN DESCRIPTION - LOCATION
LOCATION: BACK
LOCATION: ABDOMEN;PERINEUM;BACK
LOCATION: BACK
LOCATION: PERINEUM;BACK
LOCATION: BACK

## 2022-06-29 ASSESSMENT — PAIN SCALES - GENERAL
PAINLEVEL_OUTOF10: 7
PAINLEVEL_OUTOF10: 5
PAINLEVEL_OUTOF10: 4
PAINLEVEL_OUTOF10: 5
PAINLEVEL_OUTOF10: 6

## 2022-06-29 ASSESSMENT — PAIN DESCRIPTION - ORIENTATION
ORIENTATION: LOWER
ORIENTATION: LOWER
ORIENTATION: LOWER;MID
ORIENTATION: LOWER
ORIENTATION: LOWER

## 2022-06-29 NOTE — LACTATION NOTE
Pt is pumping, plans exclusively pumping. States she has not secured a pump to use at home. Encouraged pt to call insurance today and have medical necessity form signed by [facility] physician. Reviewed with pt benefits of [facility] assisting with pump, encouraged to call. Reviewed pump frequency, settings, and pumping is comfortable per pt.

## 2022-06-29 NOTE — PLAN OF CARE
Problem: Vaginal Birth or  Section  Goal: Fetal and maternal status remain reassuring during the birth process  Description:  Birth OB-Pregnancy care plan goal which identifies if the fetal and maternal status remain reassuring during the birth process  2022 1604 by Velvet Raines RN  Outcome: Progressing  2022 0738 by Marian Grider RN  Outcome: Progressing     Problem: Pain  Goal: Verbalizes/displays adequate comfort level or baseline comfort level  2022 1604 by Velvet Raines RN  Outcome: Progressing  2022 [address] by Marian Grider RN  Outcome: Progressing

## 2022-06-29 NOTE — LACTATION NOTE
Pumping is going well, per pt should be able to pick pump up tomorrow. Using 30mm flanges, reviewed frequency and encouraged to call out for assistance as needed.

## 2022-06-29 NOTE — PROGRESS NOTES
- Doing well, afebrile   - female infant in [address], circumcision desired   - Encourage ambulation   - Postpartum Hgb/Hct completed, Hgb was 10.6  2. Rh positive/Rubella immune   - Rhogam/MMR not indicated at this time  3. Bottle feeding    - Denies s/s of mastitis  4. gHTN    - Diagnosed during admission   - PreE labs wnl, P/C 0.11   - Denies any s/s PreE   - Continue to monitor closely   5. BMI 36   - Ambulation encouraged  6. Continue post partum care    Counseling Completed:  Secondary Smoke risks and Sudden Infant Death Syndrome were reviewed with recommendations. Infant sleeping, \"back to sleep\" and avoidance of co-sleeping recommendations were reviewed. Signs and Symptoms of Post Partum Depression were reviewed. The patient is to call if any occur. Signs and symptoms of Mastitis were reviewed. The patient is to call if any occur for follow up. Discharge instructions including pelvic rest, no driving with pain medicine and office follow-up were reviewed with patient     Attending Physician: Dr. Daniel Tobar MD  Ob/Gyn Resident   2022, 3:27 AM  I have discussed the care of [facility], including pertinent history and exam findings, with the resident. I have seen and examined the patient and the key elements of all parts of the encounter have been performed by me. I agree with the assessment, plan and orders as documented by the resident.     Nicolas Mondragon MD  Attending Physician

## 2022-06-29 NOTE — LACTATION NOTE
Switched pt to 27mm flanges, session almost over. Put 30mm flanges at bedside to try with next session, pt says didn't feel much difference with 27.

## 2022-06-30 VITALS
RESPIRATION RATE: 16 BRPM | OXYGEN SATURATION: 99 % | HEART RATE: 92 BPM | DIASTOLIC BLOOD PRESSURE: 89 MMHG | SYSTOLIC BLOOD PRESSURE: 135 MMHG | TEMPERATURE: 98 F

## 2022-06-30 PROCEDURE — 6370000000 HC RX 637 (ALT 250 FOR IP): Performed by: STUDENT IN AN ORGANIZED HEALTH CARE EDUCATION/TRAINING PROGRAM

## 2022-06-30 RX ADMIN — IBUPROFEN 600 MG: 600 TABLET, FILM COATED ORAL at 10:10

## 2022-06-30 RX ADMIN — IBUPROFEN 600 MG: 600 TABLET, FILM COATED ORAL at 04:17

## 2022-06-30 RX ADMIN — ACETAMINOPHEN 1000 MG: 500 TABLET ORAL at 04:18

## 2022-06-30 RX ADMIN — ACETAMINOPHEN 1000 MG: 500 TABLET ORAL at 10:10

## 2022-06-30 RX ADMIN — DOCUSATE SODIUM 100 MG: 100 CAPSULE ORAL at 10:10

## 2022-06-30 ASSESSMENT — PAIN DESCRIPTION - ORIENTATION: ORIENTATION: LOWER

## 2022-06-30 ASSESSMENT — PAIN SCALES - GENERAL
PAINLEVEL_OUTOF10: 6
PAINLEVEL_OUTOF10: 6
PAINLEVEL_OUTOF10: 4

## 2022-06-30 ASSESSMENT — PAIN - FUNCTIONAL ASSESSMENT: PAIN_FUNCTIONAL_ASSESSMENT: ACTIVITIES ARE NOT PREVENTED

## 2022-06-30 ASSESSMENT — PAIN DESCRIPTION - DESCRIPTORS: DESCRIPTORS: DISCOMFORT

## 2022-06-30 ASSESSMENT — PAIN DESCRIPTION - LOCATION: LOCATION: ABDOMEN;BACK

## 2022-06-30 NOTE — PROGRESS NOTES
POST PARTUM DAY # 2    Lauren Carreno is a 25 y.o. female  This patient was seen & examined today. Her pregnancy was complicated by:   Patient Active Problem List   Diagnosis    Circumvallate Placenta    Low-lying placenta (resolved)    Elevated Bp x 1     High-risk pregnancy in third trimester    Family history of diabetes mellitus    38 weeks gestation of pregnancy    gHTN      22 M Apg 8/9 Wt 7#4    39 weeks gestation of pregnancy    Encounter for induction of labor     Today she is doing well without any chief complaint. Her lochia is light. She denies chest pain, shortness of breath, headache, lightheadedness, blurred vision, peripheral edema, palpitations, dry cough and fatigue. She is bottle feeding and pumping and she denies any breast tenderness. She is ambulating well. Her voiding pattern is normal. I reviewed signs and symptoms of post partum depression with the patient, she currently denies any of these symptoms. She is tolerating solids. Vital Signs:  Vitals:    22 0000 22 0800 22 1600 22   BP: 118/78 118/80 (!) 140/84 (!) 121/94   Pulse: 100 95 100 (!) 107   Resp: 16 16 16 16   Temp: 97.5 °F (36.4 °C) 97.8 °F (36.6 °C) 98.1 °F (36.7 °C) 98.4 °F (36.9 °C)   TempSrc: Oral Oral Oral Oral   SpO2:  97%       Physical Exam:  General:  no apparent distress, alert and cooperative  Neurologic:  alert, oriented, normal speech, no focal findings or movement disorder noted  Lungs:  No increased work of breathing, good air exchange, clear to auscultation bilaterally, no crackles or wheezing  Heart:  regular rate and rhythm    Abdomen: abdomen soft, non-distended, non-tender  Fundus: non-tender, normal size, firm, below umbilicus  Extremities:  no calf tenderness, non edematous    Lab:  Lab Results   Component Value Date    HGB 10.6 (L) 2022     Lab Results   Component Value Date    HCT 34.5 (L) 2022     Assessment/Plan:  1.  Lauren Carreno is a  PPD # 2 s/p    - Doing well, afebrile   - male infant in [address], circumcision desired   - Encourage ambulation   - Postpartum Hgb/Hct completed, Hgb was 10.6  2. Rh positive/Rubella immune   - Rhogam/MMR not indicated at this time  3. Bottle feeding and pumping    - Denies s/s of mastitis  4. Anemia   - Hgb on admission was 10.7. Stable after delivery (10.6)   - Clinically asymptomatic   - Continue to monitor closely   5. gHTN    - Newly diagnosed during admission   - BP intermittently elevated but nonsevere range   - PreE labs wnl, P/C 0.11   6. Continue post partum care    Counseling Completed:  Secondary Smoke risks and Sudden Infant Death Syndrome were reviewed with recommendations. Infant sleeping, \"back to sleep\" and avoidance of co-sleeping recommendations were reviewed. Signs and Symptoms of Post Partum Depression were reviewed. The patient is to call if any occur. Signs and symptoms of Mastitis were reviewed. The patient is to call if any occur for follow up.   Discharge instructions including pelvic rest, no driving with pain medicine and office follow-up were reviewed with patient     Attending Physician: Dr. Herrera Martinez MD  Ob/Gyn Resident   2022, 2:05 AM

## 2022-06-30 NOTE — PLAN OF CARE
Problem: Pain  Goal: Verbalizes/displays adequate comfort level or baseline comfort level  2022 1604 by Braxton Martinez RN  Outcome: Progressing     Problem: Vaginal Birth or  Section  Goal: Fetal and maternal status remain reassuring during the birth process  Description:  Birth OB-Pregnancy care plan goal which identifies if the fetal and maternal status remain reassuring during the birth process  2022 0520 by Spenser Carrington RN  Outcome: Completed  2022 1604 by Braxton Martinez RN  Outcome: Progressing

## 2022-06-30 NOTE — PROGRESS NOTES
CLINICAL PHARMACY NOTE: MEDS TO BEDS    Total # of Prescriptions Filled: 2   The following medications were delivered to the patient:  · Motrin 800  · Colace 100mg    Additional Documentation: meds delivered to the pt in room 748 on 06.30.22 at 10:33 $5.03 co pay, paid cash meds delivered by Serenity Milner

## 2022-06-30 NOTE — FLOWSHEET NOTE
I have reviewed all [facility] Post-Birth Warning Signs and essential teaching points for pulmonary embolism, cardiac disease, hypertensive disorders of pregnancy, obstetric hemorrhage, venous thromboembolism, infection, and postpartum depression with the patient and FOB. I have informed the patient on when to call their healthcare provider and when to call 911. I have discussed with the patient  the importance of scheduling a follow-up visit with their physician, nurse practitioner or midwife and provided them with correct contact information for appointment. I have provided the patient with a copy of the \"Save Your Life\" handout. The patient has acknowledged receiving and understanding this education with her signature. Discharge instructions and follow up care reviewed.

## 2022-06-30 NOTE — PLAN OF CARE
Problem: Vaginal Birth or  Section  Goal: Fetal and maternal status remain reassuring during the birth process  Description:  Birth OB-Pregnancy care plan goal which identifies if the fetal and maternal status remain reassuring during the birth process  2022 0520 by Joanie Sal RN  Outcome: Completed     Problem: Pain  Goal: Verbalizes/displays adequate comfort level or baseline comfort level  Outcome: Completed

## 2022-07-01 PROBLEM — Z3A.38 38 WEEKS GESTATION OF PREGNANCY: Status: RESOLVED | Noted: 2022-06-26 | Resolved: 2022-07-01

## 2022-07-01 PROBLEM — O09.93 HIGH-RISK PREGNANCY IN THIRD TRIMESTER: Status: RESOLVED | Noted: 2022-06-20 | Resolved: 2022-07-01

## 2022-08-05 ENCOUNTER — OFFICE VISIT (OUTPATIENT)
Dept: OBGYN CLINIC | Age: 22
End: 2022-08-05

## 2022-08-05 VITALS — SYSTOLIC BLOOD PRESSURE: 114 MMHG | BODY MASS INDEX: 33.25 KG/M2 | WEIGHT: 206 LBS | DIASTOLIC BLOOD PRESSURE: 70 MMHG

## 2022-08-05 PROBLEM — O16.3 ELEVATED BLOOD PRESSURE AFFECTING PREGNANCY IN THIRD TRIMESTER, ANTEPARTUM: Status: RESOLVED | Noted: 2022-06-20 | Resolved: 2022-08-05

## 2022-08-05 PROBLEM — O43.119 ANTEPARTUM PLACENTA CIRCUMVALLATA: Status: RESOLVED | Noted: 2022-02-14 | Resolved: 2022-08-05

## 2022-08-05 PROBLEM — O44.40 LOW-LYING PLACENTA: Status: RESOLVED | Noted: 2022-02-14 | Resolved: 2022-08-05

## 2022-08-05 PROCEDURE — 0503F POSTPARTUM CARE VISIT: CPT | Performed by: NURSE PRACTITIONER

## 2022-08-05 NOTE — PROGRESS NOTES
Susan Freed is a 25 y.o. female    Delivery Information:  Delivery Date: 2022    Sex of Baby: male  Type of Delivery: Vaginal  Delivering Physician: dr Josefa Eckert Questions:  No Do you Smoke? If yes PPD is 0  No Do you intake caffeine? No Do you use street drugs? No Do you consume alcohol? No Are breast feeding? Yes Are you bottle feeding? No Are you having any problems with your breasts? (lumps, discharge, asymmetry, or redness)  No Are you having any problems with bowel movements or urination? No Are you having any vaginal discharge/itching/ or burning? Yes Are you getting help and support with the baby? No Have you had intercourse since your delivery? No If you had intercourse did you use condoms? No Have you had a menstrual cycle since delivery? Date: 0  No Do you have any questions that need to be addressed today? How long did you bleed after your delivery? 3 Weeks  What are your plans besides condoms for family planning? unsure  Who lives at home with you and your baby? The patient was counseled on the risks of tobacco abuse and the harm it may cause to the patient and her  baby as well as others in the household from secondary smoke exposure. The patient was counseled on the risks of potential respiratory problems, cancers, and sudden infant death syndrome as well as other co-morbidities. These were discussed in detail. I reviewed cessation options and plans. The patient was counseled on smoking outdoors with appropriate covers of clothing and hair. She was counseled on hand washing prior to holding or picking up the baby. The patient had her questions answered in regards to the baby and was instructed to follow up with her pediatrician. She had reviewed with her safe sleep recommendations.     Vitals:    22 1108   BP: 114/70   Site: Right Upper Arm   Position: Sitting   Cuff Size: Medium Adult   Weight: 206 lb (93.4 kg)        Physical Exam:  Chaperone for Intimate Exam  Chaperone was offered and accepted as part of the rooming process. Chaperone: NA       General Appearance: This  is a well Developed, well Nourished, well groomed female. Her BMI was reviewed. Nutritional decision making was discussed. Skin:  There was a Normal Inspection of the skin without rashes or lesions. There were no rashes. (Papular, Maculopapular, Hives, Pustular, Macular)     There were no lesions (Ulcers, Erythema, Abn. Appearing Nevi)            Lymphatic:  No Lymph Nodes were Palpable in the neck , axilla or groin.  0 # Of Lymph Nodes; Location ; Character [Normal]  [Shotty] [Tender] [Enlarged]     Neck and EENT:  The neck was supple. There were no masses   The thyroid was not enlarged and had no masses. Perrla, EOMI B/L, TMI B/L No Abnormalities. Throat inspected-No exudates or Masses, Nares Patent No Masses        Respiratory: The lungs were auscultated and found to be clear. There were no rales, rhonchi or wheezes. There was a good respiratory effort. Cardiovascular: The heart was in a regular rate and rhythm. . No S3 or S4. There was no murmur appreciated. Location, grade, and radiation are not applicable. Extremities: The patients extremities were without calf tenderness, edema, or varicosities. There was full range of motion in all four extremities. Pulses in all four extremities were appreciated and are 2/4. Abdomen: The abdomen was soft and non-tender. There were good bowel sounds in all quadrants and there was no guarding, rebound or rigidity. On evaluation there was no evidence of hepatosplenomegaly and there was no costal vertebral nara tenderness bilaterally. No hernias were appreciated. Abdominal Scars: none    Psych: The patient had a normal Orientation to: Time, Place, Person, and Situation  There is no Mood / Affect changes    Breast:  (Chest)  deferred  Self breast exams were reviewed in detail.  Literature was

## 2023-01-05 ENCOUNTER — OFFICE VISIT (OUTPATIENT)
Dept: OBGYN CLINIC | Age: 23
End: 2023-01-05
Payer: COMMERCIAL

## 2023-01-05 VITALS
HEIGHT: 66 IN | DIASTOLIC BLOOD PRESSURE: 78 MMHG | SYSTOLIC BLOOD PRESSURE: 124 MMHG | WEIGHT: 196 LBS | BODY MASS INDEX: 31.5 KG/M2

## 2023-01-05 DIAGNOSIS — Z01.419 WELL FEMALE EXAM WITH ROUTINE GYNECOLOGICAL EXAM: Primary | ICD-10-CM

## 2023-01-05 PROCEDURE — 99395 PREV VISIT EST AGE 18-39: CPT | Performed by: NURSE PRACTITIONER

## 2023-01-05 RX ORDER — NORETHINDRONE ACETATE AND ETHINYL ESTRADIOL 1; .02 MG/1; MG/1
TABLET ORAL
COMMUNITY
Start: 2022-12-28

## 2023-01-05 RX ORDER — CONDOMS, FEMALE
EACH MISCELLANEOUS
COMMUNITY
Start: 2022-10-22 | End: 2023-01-05

## 2023-01-05 NOTE — PROGRESS NOTES
History and Physical  [address], [address] [facility] [address] [phone]   Fax [phone]  Osvaldo Skiff  2023              25 y.o. Chief Complaint   Patient presents with    Annual Exam       Patient's last menstrual period was 2022 (exact date). Primary Care Physician: No primary care provider on file. The patient was seen and examined. She has no chief complaint today and is here for her annual exam.  Her bowels are regular. There are no voiding complaints. She denies any bloating. She denies vaginal discharge and was counseled on STD's and the need for barrier contraception. HPI : Osvaldo Skiff is a 25 y.o. female     Annual exam   NO CC  Desires to continue on OCP with online service      no Bloating  no Early Satiety  no Unexplained weight change of more than 15 lbs  no  PMB  no  PCB  ________________________________________________________________________  OB History    Para Term  AB Living   1 1 1 0 0 1   SAB IAB Ectopic Molar Multiple Live Births   0 0 0 0 0 1      # Outcome Date GA Lbr Daniel/2nd Weight Sex Delivery Anes PTL Lv   1 Term 22 39w1d / 00:40 7 lb 4.4 oz (3.3 kg) M Vag-Spont EPI N VANDANA      Name: Ilan Aquino: 8  Apgar5: 9     Past Medical History:   Diagnosis Date    Anemia     Atopic dermatitis 10/23/2011    Depression     anxiety    Multiple fractures 10/23/2011    Otitis media, left 1/10/2012    Sports physical 10/17/2016    Sprain of interphalangeal joint of right thumb 2021    Vasovagal near syncope 3/28/2017                                                                   No past surgical history on file.   Family History   Problem Relation Age of Onset    Rheum Arthritis Mother         psoriatic arthritis    Hypertension Mother     Diabetes Mother     Heart Disease Mother         by pass    Irritable Bowel Syndrome Mother     Hypertension Father Asthma Sister     Arthritis Maternal Grandmother     Asthma Maternal Grandmother     Heart Disease Maternal Grandfather     Diabetes Maternal Grandfather     Stroke Paternal Grandmother     Cancer Paternal Grandfather         prostate    Heart Disease Paternal Grandfather         arrythmia    Diabetes Paternal Grandfather     Bipolar Disorder Maternal Aunt      Social History     Socioeconomic History    Marital status: Single     Spouse name: Not on file    Number of children: Not on file    Years of education: Not on file    Highest education level: Not on file   Occupational History    Not on file   Tobacco Use    Smoking status: Never     Passive exposure: Yes    Smokeless tobacco: Never    Tobacco comments:     dad smokes outside   Vaping Use    Vaping Use: Never used   Substance and Sexual Activity    Alcohol use: Not Currently     Alcohol/week: 0.0 standard drinks    Drug use: Not Currently    Sexual activity: Never   Other Topics Concern    Not on file   Social History Narrative    Not on file     Social Determinants of Health     Financial Resource Strain: Not on file   Food Insecurity: Not on file   Transportation Needs: Not on file   Physical Activity: Not on file   Stress: Not on file   Social Connections: Not on file   Intimate Partner Violence: Not on file   Housing Stability: Not on file       MEDICATIONS:  Current Outpatient Medications   Medication Sig Dispense Refill    norethindrone-ethinyl estradiol (MICROGESTIN 1/20) 1-20 MG-MCG per tablet        No current facility-administered medications for this visit. ALLERGIES:  Allergies as of 01/05/2023    (No Known Allergies)       Symptoms of decreased mood absent  Symptoms of anhedonia absent    **If either question is answered in a  positive fashion then complete the PHQ9 Scoring Evaluation and make the appropriate referral**      Immunization status: up to date and documented.       Gynecologic History:  Menarche: 15 yo  Menopause at [age] yo     Patient's last menstrual period was 12/28/2022 (exact date). Sexually Active: Yes    STD History: No     Permanent Sterilization: No   Reversible Birth Control: Yes OCP        Hormone Replacement Exposure: No      Genetic Qualified Family History of Breast, Ovarian , Colon or Uterine Cancer: No     If YES see scanned worksheet. Preventative Health Testing:    Health Maintenance:  Health Maintenance Due   Topic Date Due    COVID-19 Vaccine (1) Never done    Depression Screen  Never done    Hepatitis C screen  Never done    Chlamydia/GC screen  10/19/2018    Pap smear  Never done    Flu vaccine (1) 08/01/2022       Date of Last Pap Smear: 1/2022 neg  Abnormal Pap Smear History: denies  Colposcopy History:   Date of Last Mammogram: NA  Date of Last Colonoscopy:   Date of Last Bone Density:      ________________________________________________________________________        REVIEW OF SYSTEMS:    see problem list   A minimum of an eleven point review of systems was completed. Review Of Systems (11 point):  Constitutional: No fever, chills or malaise; No weight change or fatigue  Head and Eyes: No vision changes, Headache, Dizziness or trauma in last 12 months  ENT ROS: No hearing, Tinnitis, sinus or taste problems  Hematological and Lymphatic ROS:No Lymphoma, Von Willebrand's, Hemophillia or Bleeding History  Psych ROS: No Depression, Homicidal thoughts,suicidal thoughts, or anxiety  Breast ROS: No breast abnormalities or lumps  Respiratory ROS: No SOB, Pneumoniae,Cough, or Pulmonary Embolism   Cardiovascular ROS: No Chest Pain with Exertion, Palpitations, Syncope, Edema, Arrhythmia  Gastrointestinal ROS: No Indigestion, Heartburn, Nausea, vomiting, Diarrhea, Constipation,or Bowel Changes; No Bloody Stools or melena  Genito-Urinary ROS: No Dysuria, Hematuria or Nocturia.  No Urinary Incontinence or Vaginal Discharge  Musculoskeletal ROS: No Arthralgia, Arthritis,Gout,Osteoporosis or Rheumatism  Neurological ROS: No CVA, Migraines, Epilepsy, Seizure Hx, or Limb Weakness  Dermatological ROS: No Rash, Itching, Hives, Mole Changes or Cancer                                                                                                                                                                                                                                  PHYSICAL Exam:     Constitutional:  Vitals:    01/05/23 1501   BP: 124/78   Site: Right Upper Arm   Position: Sitting   Cuff Size: Medium Adult   Weight: 196 lb (88.9 kg)   Height: 5' 6\" (1.676 m)       Chaperone for Intimate Exam  Chaperone was offered and accepted as part of the rooming process. Chaperone: Stacey LIU          General Appearance: This  is a well Developed, well Nourished, well groomed female. Her BMI was reviewed. Nutritional decision making was discussed. Skin:  There was a Normal Inspection of the skin without rashes or lesions. There were no rashes. (Papular, Maculopapular, Hives, Pustular, Macular)     There were no lesions (Ulcers, Erythema, Abn. Appearing Nevi)            Lymphatic:  No Lymph Nodes were Palpable in the neck , axilla or groin.  0 # Of Lymph Nodes; Location ; Character [Normal]  [Shotty] [Tender] [Enlarged]     Neck and EENT:  The neck was supple. There were no masses   The thyroid was not enlarged and had no masses. Perrla, EOMI B/L, TMI B/L No Abnormalities. Throat inspected-No exudates or Masses, Nares Patent No Masses        Respiratory: The lungs were auscultated and found to be clear. There were no rales, rhonchi or wheezes. There was a good respiratory effort. Cardiovascular: The heart was in a regular rate and rhythm. . No S3 or S4. There was no murmur appreciated. Location, grade, and radiation are not applicable. Extremities: The patients extremities were without calf tenderness, edema, or varicosities. There was full range of motion in all four extremities.  Pulses in all four extremities were appreciated and are 2/4. Abdomen: The abdomen was soft and non-tender. There were good bowel sounds in all quadrants and there was no guarding, rebound or rigidity. On evaluation there was no evidence of hepatosplenomegaly and there was no costal vertebral nara tenderness bilaterally. No hernias were appreciated. Abdominal Scars: none    Psych: The patient had a normal Orientation to: Time, Place, Person, and Situation  There is no Mood / Affect changes    Breast:  (Chest)  normal appearance, no masses or tenderness, Inspection negative, No nipple retraction or dimpling, No nipple discharge or bleeding, No axillary or supraclavicular adenopathy, Normal to palpation without dominant masses  Self breast exams were reviewed in detail. Literature was given. Pelvic Exam:  External genitalia: normal general appearance  Urinary system: urethral meatus normal  Vaginal: normal mucosa without prolapse or lesions  Cervix: normal appearance  Adnexa: normal bimanual exam  Uterus: normal single, nontender    Rectal Exam:  exam declined by patient          Musculosk:  Normal Gait and station was noted. Digits were evaluated without abnormal findings. Range of motion, stability and strength were evaluated and found to be appropriate for the patients age. ASSESSMENT:      25 y.o. Annual   Diagnosis Orders   1.  Well female exam with routine gynecological exam               Chief Complaint   Patient presents with    Annual Exam          Past Medical History:   Diagnosis Date    Anemia     Atopic dermatitis 10/23/2011    Depression     anxiety    Multiple fractures 10/23/2011    Otitis media, left 1/10/2012    Sports physical 10/17/2016    Sprain of interphalangeal joint of right thumb 2021    Vasovagal near syncope 3/28/2017         Patient Active Problem List    Diagnosis Date Noted     22 M Apg 8/9 Wt 7#4 2022     Priority: Medium    gHTN  2022     Dx during labor      Family history of diabetes mellitus 06/26/2022     Early 1hr GTT            Hereditary Breast, Ovarian, Colon and Uterine Cancer screening Done. Tobacco & Secondary smoke risks reviewed; instructed on cessation and avoidance      Counseling Completed:  Preventative Health Recommendations and Follow up. The patient was informed of the recommended preventative health recommendations. 1. Annuals every year; Cytology collections per prevailing guidelines. 2. Mammograms begin every year at 35 yo if no abnormalities are found and no family history. 3. Bone density studies every 2-3 years. Begin at 73 yo. If no fracture history or osteoporosis family history. (significant). 4. Colonoscopy begin at 38 yo. Repeat every ten years if negative and no family history. 5. Calcium of 4744-1702 mg/day in split dosing  6. Vitamin D 400-800 IU/day  7. All other preventative health recommendations will be managed by the patients Primary care physician. PLAN:  Return in about 1 year (around 1/5/2024) for annual.  Repeat Annual every 1 year  Cervical Cytology Evaluation begins at 24years old. If Negative Cytology, Follow-up screening per current guidelines. Mammograms every 1 year. If 35 yo and last mammogram was negative. Calcium and Vitamin D dosing reviewed. Colonoscopy screening reviewed as well as onset for bone density testing. Birth control and barrier recommendations discussed. STD counseling and prevention reviewed. Gardisil counseling completed for all patients 10-35 yo. Routine health maintenance per patients PCP. No orders of the defined types were placed in this encounter. The patient, Kylie Costa is a 25 y.o. female, was seen with a total time spent of 30 minutes for the visit on this date of service by the E/M provider. The time component had both face to face and non face to face time spent in determining the total time component.   Counseling and education regarding her diagnosis listed below and her options regarding those diagnoses were also included in determining her time component. Diagnosis Orders   1. Well female exam with routine gynecological exam             The patient had her preventative health maintenance recommendations and follow-up reviewed with her at the completion of her visit.

## 2023-11-15 ENCOUNTER — HOSPITAL ENCOUNTER (EMERGENCY)
Age: 23
Discharge: HOME | End: 2023-11-16
Attending: EMERGENCY MEDICINE
Payer: COMMERCIAL

## 2023-11-15 ENCOUNTER — APPOINTMENT (OUTPATIENT)
Dept: CT IMAGING | Age: 23
End: 2023-11-15
Payer: COMMERCIAL

## 2023-11-15 VITALS
HEIGHT: 66 IN | DIASTOLIC BLOOD PRESSURE: 64 MMHG | BODY MASS INDEX: 22.5 KG/M2 | TEMPERATURE: 98.4 F | RESPIRATION RATE: 17 BRPM | HEART RATE: 62 BPM | OXYGEN SATURATION: 99 % | SYSTOLIC BLOOD PRESSURE: 109 MMHG | WEIGHT: 140 LBS

## 2023-11-15 DIAGNOSIS — R11.2 NAUSEA, VOMITING AND DIARRHEA: Primary | ICD-10-CM

## 2023-11-15 DIAGNOSIS — R10.13 EPIGASTRIC PAIN: ICD-10-CM

## 2023-11-15 DIAGNOSIS — R19.7 NAUSEA, VOMITING AND DIARRHEA: Primary | ICD-10-CM

## 2023-11-15 LAB
ALBUMIN SERPL-MCNC: 4.1 G/DL (ref 3.5–5.2)
ALP SERPL-CCNC: 60 U/L (ref 35–104)
ALT SERPL-CCNC: 8 U/L (ref 5–33)
ANION GAP SERPL CALCULATED.3IONS-SCNC: 9 MMOL/L (ref 9–17)
AST SERPL-CCNC: 9 U/L
BACTERIA URNS QL MICRO: ABNORMAL
BASOPHILS # BLD: 0 K/UL (ref 0–0.2)
BASOPHILS NFR BLD: 0 % (ref 0–2)
BILIRUB SERPL-MCNC: 0.7 MG/DL (ref 0.3–1.2)
BILIRUB UR QL STRIP: NEGATIVE
BUN SERPL-MCNC: 8 MG/DL (ref 6–20)
CALCIUM SERPL-MCNC: 8.8 MG/DL (ref 8.6–10.4)
CASTS #/AREA URNS LPF: ABNORMAL /LPF
CHLORIDE SERPL-SCNC: 103 MMOL/L (ref 98–107)
CLARITY UR: CLEAR
CO2 SERPL-SCNC: 25 MMOL/L (ref 20–31)
COLOR UR: YELLOW
CREAT SERPL-MCNC: 0.5 MG/DL (ref 0.5–0.9)
EOSINOPHIL # BLD: 0.1 K/UL (ref 0–0.4)
EOSINOPHILS RELATIVE PERCENT: 1 % (ref 0–4)
EPI CELLS #/AREA URNS HPF: ABNORMAL /HPF
ERYTHROCYTE [DISTWIDTH] IN BLOOD BY AUTOMATED COUNT: 13.2 % (ref 11.5–14.9)
GFR SERPL CREATININE-BSD FRML MDRD: >60 ML/MIN/1.73M2
GLUCOSE SERPL-MCNC: 93 MG/DL (ref 70–99)
GLUCOSE UR STRIP-MCNC: NEGATIVE MG/DL
HCG SERPL QL: NEGATIVE
HCT VFR BLD AUTO: 40.8 % (ref 36–46)
HGB BLD-MCNC: 13.4 G/DL (ref 12–16)
HGB UR QL STRIP.AUTO: ABNORMAL
KETONES UR STRIP-MCNC: ABNORMAL MG/DL
LEUKOCYTE ESTERASE UR QL STRIP: NEGATIVE
LIPASE SERPL-CCNC: 12 U/L (ref 13–60)
LYMPHOCYTES NFR BLD: 1.6 K/UL (ref 1–4.8)
LYMPHOCYTES RELATIVE PERCENT: 12 % (ref 24–44)
MCH RBC QN AUTO: 31.2 PG (ref 26–34)
MCHC RBC AUTO-ENTMCNC: 32.7 G/DL (ref 31–37)
MCV RBC AUTO: 95.4 FL (ref 80–100)
MONOCYTES NFR BLD: 0.5 K/UL (ref 0.1–1.3)
MONOCYTES NFR BLD: 4 % (ref 1–7)
NEUTROPHILS NFR BLD: 83 % (ref 36–66)
NEUTS SEG NFR BLD: 11.5 K/UL (ref 1.3–9.1)
NITRITE UR QL STRIP: NEGATIVE
PH UR STRIP: 5.5 [PH] (ref 5–8)
PLATELET # BLD AUTO: 253 K/UL (ref 150–450)
PMV BLD AUTO: 8 FL (ref 6–12)
POTASSIUM SERPL-SCNC: 3.5 MMOL/L (ref 3.7–5.3)
PROT SERPL-MCNC: 7.2 G/DL (ref 6.4–8.3)
PROT UR STRIP-MCNC: NEGATIVE MG/DL
RBC # BLD AUTO: 4.28 M/UL (ref 4–5.2)
RBC #/AREA URNS HPF: ABNORMAL /HPF
SODIUM SERPL-SCNC: 137 MMOL/L (ref 135–144)
SP GR UR STRIP: 1.03 (ref 1–1.03)
UROBILINOGEN UR STRIP-ACNC: NORMAL EU/DL (ref 0–1)
WBC #/AREA URNS HPF: ABNORMAL /HPF
WBC OTHER # BLD: 13.7 K/UL (ref 3.5–11)

## 2023-11-15 PROCEDURE — 85025 COMPLETE CBC W/AUTO DIFF WBC: CPT

## 2023-11-15 PROCEDURE — 2580000003 HC RX 258

## 2023-11-15 PROCEDURE — 96376 TX/PRO/DX INJ SAME DRUG ADON: CPT

## 2023-11-15 PROCEDURE — 84703 CHORIONIC GONADOTROPIN ASSAY: CPT

## 2023-11-15 PROCEDURE — 83690 ASSAY OF LIPASE: CPT

## 2023-11-15 PROCEDURE — 96374 THER/PROPH/DIAG INJ IV PUSH: CPT

## 2023-11-15 PROCEDURE — 96375 TX/PRO/DX INJ NEW DRUG ADDON: CPT

## 2023-11-15 PROCEDURE — 6360000004 HC RX CONTRAST MEDICATION

## 2023-11-15 PROCEDURE — 99285 EMERGENCY DEPT VISIT HI MDM: CPT

## 2023-11-15 PROCEDURE — 6360000002 HC RX W HCPCS

## 2023-11-15 PROCEDURE — 36415 COLL VENOUS BLD VENIPUNCTURE: CPT

## 2023-11-15 PROCEDURE — 80053 COMPREHEN METABOLIC PANEL: CPT

## 2023-11-15 PROCEDURE — 74177 CT ABD & PELVIS W/CONTRAST: CPT

## 2023-11-15 PROCEDURE — 81001 URINALYSIS AUTO W/SCOPE: CPT

## 2023-11-15 RX ORDER — 0.9 % SODIUM CHLORIDE 0.9 %
100 INTRAVENOUS SOLUTION INTRAVENOUS ONCE
Status: COMPLETED | OUTPATIENT
Start: 2023-11-15 | End: 2023-11-15

## 2023-11-15 RX ORDER — ONDANSETRON 4 MG/1
4 TABLET, ORALLY DISINTEGRATING ORAL EVERY 8 HOURS PRN
Qty: 20 TABLET | Refills: 0 | Status: SHIPPED | OUTPATIENT
Start: 2023-11-15 | End: 2023-11-22

## 2023-11-15 RX ORDER — SODIUM CHLORIDE 0.9 % (FLUSH) 0.9 %
10 SYRINGE (ML) INJECTION PRN
Status: DISCONTINUED | OUTPATIENT
Start: 2023-11-15 | End: 2023-11-16 | Stop reason: HOSPADM

## 2023-11-15 RX ORDER — MORPHINE SULFATE 4 MG/ML
4 INJECTION, SOLUTION INTRAMUSCULAR; INTRAVENOUS ONCE
Status: COMPLETED | OUTPATIENT
Start: 2023-11-15 | End: 2023-11-15

## 2023-11-15 RX ORDER — OXYCODONE HYDROCHLORIDE AND ACETAMINOPHEN 5; 325 MG/1; MG/1
1 TABLET ORAL EVERY 6 HOURS PRN
Qty: 6 TABLET | Refills: 0 | Status: SHIPPED | OUTPATIENT
Start: 2023-11-15 | End: 2023-11-18

## 2023-11-15 RX ORDER — 0.9 % SODIUM CHLORIDE 0.9 %
1000 INTRAVENOUS SOLUTION INTRAVENOUS ONCE
Status: COMPLETED | OUTPATIENT
Start: 2023-11-15 | End: 2023-11-15

## 2023-11-15 RX ORDER — SODIUM CHLORIDE 0.9 % (FLUSH) 0.9 %
3 SYRINGE (ML) INJECTION EVERY 8 HOURS
Status: DISCONTINUED | OUTPATIENT
Start: 2023-11-15 | End: 2023-11-16 | Stop reason: HOSPADM

## 2023-11-15 RX ORDER — ONDANSETRON 2 MG/ML
4 INJECTION INTRAMUSCULAR; INTRAVENOUS ONCE
Status: COMPLETED | OUTPATIENT
Start: 2023-11-15 | End: 2023-11-15

## 2023-11-15 RX ADMIN — SODIUM CHLORIDE 1000 ML: 9 INJECTION, SOLUTION INTRAVENOUS at 21:08

## 2023-11-15 RX ADMIN — SODIUM CHLORIDE 100 ML: 9 INJECTION, SOLUTION INTRAVENOUS at 22:07

## 2023-11-15 RX ADMIN — ONDANSETRON 4 MG: 2 INJECTION INTRAMUSCULAR; INTRAVENOUS at 23:15

## 2023-11-15 RX ADMIN — MORPHINE SULFATE 4 MG: 4 INJECTION, SOLUTION INTRAMUSCULAR; INTRAVENOUS at 21:08

## 2023-11-15 RX ADMIN — IOPAMIDOL 75 ML: 755 INJECTION, SOLUTION INTRAVENOUS at 22:07

## 2023-11-15 RX ADMIN — SODIUM CHLORIDE, PRESERVATIVE FREE 10 ML: 5 INJECTION INTRAVENOUS at 22:02

## 2023-11-15 RX ADMIN — MORPHINE SULFATE 4 MG: 4 INJECTION, SOLUTION INTRAMUSCULAR; INTRAVENOUS at 23:16

## 2023-11-15 ASSESSMENT — PAIN DESCRIPTION - DESCRIPTORS
DESCRIPTORS: ACHING;SHARP
DESCRIPTORS: ACHING
DESCRIPTORS: CRAMPING

## 2023-11-15 ASSESSMENT — LIFESTYLE VARIABLES
HOW OFTEN DO YOU HAVE A DRINK CONTAINING ALCOHOL: MONTHLY OR LESS
HOW MANY STANDARD DRINKS CONTAINING ALCOHOL DO YOU HAVE ON A TYPICAL DAY: 1 OR 2

## 2023-11-15 ASSESSMENT — PAIN SCALES - GENERAL
PAINLEVEL_OUTOF10: 5
PAINLEVEL_OUTOF10: 6
PAINLEVEL_OUTOF10: 5

## 2023-11-15 ASSESSMENT — PAIN DESCRIPTION - ORIENTATION
ORIENTATION: LOWER
ORIENTATION: MID
ORIENTATION: LOWER

## 2023-11-15 ASSESSMENT — PAIN DESCRIPTION - LOCATION
LOCATION: ABDOMEN

## 2023-11-15 ASSESSMENT — PAIN - FUNCTIONAL ASSESSMENT: PAIN_FUNCTIONAL_ASSESSMENT: 0-10

## 2023-11-16 ENCOUNTER — HOSPITAL ENCOUNTER (OUTPATIENT)
Dept: ULTRASOUND IMAGING | Age: 23
Discharge: HOME | End: 2023-11-18
Payer: COMMERCIAL

## 2023-11-16 DIAGNOSIS — R10.13 EPIGASTRIC PAIN: ICD-10-CM

## 2023-11-16 PROCEDURE — 76705 ECHO EXAM OF ABDOMEN: CPT

## 2023-11-16 NOTE — ED NOTES
Discharge instructions reviewed with patient, noting all directions and education by provider. Patient verbalizes understanding of all information reviewed, gathered personal items, and transferred under own power off unit to lobby without incident.       Kayla Casiano  01/42/27 0003

## 2023-11-16 NOTE — ED TRIAGE NOTES
Mode of arrival (squad #, walk in, police, etc) : walk-in        Chief complaint(s): abdominal pain N/V        Arrival Note (brief scenario, treatment PTA, etc). : patient states 3 days ago the above symptoms started. C= \"Have you ever felt that you should Cut down on your drinking? \"  No  A= \"Have people Annoyed you by criticizing your drinking? \"  No  G= \"Have you ever felt bad or Guilty about your drinking? \"  No  E= \"Have you ever had a drink as an Eye-opener first thing in the morning to steady your nerves or to help a hangover? \"  No      Deferred []      Reason for deferring: N/A    *If yes to two or more: probable alcohol abuse. *

## 2023-11-20 ENCOUNTER — OFFICE VISIT (OUTPATIENT)
Dept: FAMILY MEDICINE CLINIC | Age: 23
End: 2023-11-20
Payer: COMMERCIAL

## 2023-11-20 VITALS
OXYGEN SATURATION: 99 % | SYSTOLIC BLOOD PRESSURE: 90 MMHG | HEIGHT: 66 IN | WEIGHT: 152.2 LBS | DIASTOLIC BLOOD PRESSURE: 56 MMHG | RESPIRATION RATE: 14 BRPM | BODY MASS INDEX: 24.46 KG/M2 | HEART RATE: 68 BPM

## 2023-11-20 DIAGNOSIS — R10.11 RIGHT UPPER QUADRANT ABDOMINAL PAIN: ICD-10-CM

## 2023-11-20 DIAGNOSIS — K80.20 GALLSTONES: Primary | ICD-10-CM

## 2023-11-20 DIAGNOSIS — R10.30 LOWER ABDOMINAL PAIN: ICD-10-CM

## 2023-11-20 PROCEDURE — 99203 OFFICE O/P NEW LOW 30 MIN: CPT | Performed by: FAMILY MEDICINE

## 2023-11-20 SDOH — ECONOMIC STABILITY: HOUSING INSECURITY
IN THE LAST 12 MONTHS, WAS THERE A TIME WHEN YOU DID NOT HAVE A STEADY PLACE TO SLEEP OR SLEPT IN A SHELTER (INCLUDING NOW)?: NO

## 2023-11-20 SDOH — ECONOMIC STABILITY: FOOD INSECURITY: WITHIN THE PAST 12 MONTHS, YOU WORRIED THAT YOUR FOOD WOULD RUN OUT BEFORE YOU GOT MONEY TO BUY MORE.: NEVER TRUE

## 2023-11-20 SDOH — ECONOMIC STABILITY: FOOD INSECURITY: WITHIN THE PAST 12 MONTHS, THE FOOD YOU BOUGHT JUST DIDN'T LAST AND YOU DIDN'T HAVE MONEY TO GET MORE.: NEVER TRUE

## 2023-11-20 SDOH — ECONOMIC STABILITY: INCOME INSECURITY: HOW HARD IS IT FOR YOU TO PAY FOR THE VERY BASICS LIKE FOOD, HOUSING, MEDICAL CARE, AND HEATING?: NOT HARD AT ALL

## 2023-11-20 ASSESSMENT — PATIENT HEALTH QUESTIONNAIRE - PHQ9
2. FEELING DOWN, DEPRESSED OR HOPELESS: 0
SUM OF ALL RESPONSES TO PHQ QUESTIONS 1-9: 0
1. LITTLE INTEREST OR PLEASURE IN DOING THINGS: 0
SUM OF ALL RESPONSES TO PHQ QUESTIONS 1-9: 0
SUM OF ALL RESPONSES TO PHQ9 QUESTIONS 1 & 2: 0

## 2023-11-20 ASSESSMENT — ENCOUNTER SYMPTOMS
BLOOD IN STOOL: 0
ABDOMINAL PAIN: 1
SHORTNESS OF BREATH: 0
CHEST TIGHTNESS: 0

## 2023-11-20 NOTE — PROGRESS NOTES
abdominal pain is mainly in the lower abdomen but she also has some right upper quadrant abdominal pain. She states that her nausea and vomiting has resolved. She denies any fever, chills, chest pain or shortness of breath. Review of Systems   Constitutional:  Negative for chills and fever. HENT:  Negative for congestion. Respiratory:  Negative for chest tightness and shortness of breath. Cardiovascular:  Negative for chest pain. Gastrointestinal:  Positive for abdominal pain. Negative for blood in stool. Genitourinary:  Negative for dysuria and hematuria. Skin:  Negative for rash. Neurological:  Negative for dizziness. Objective:   Physical Exam  Vitals and nursing note reviewed. Constitutional:       General: She is not in acute distress. Appearance: She is well-developed. HENT:      Head: Normocephalic and atraumatic. Right Ear: Tympanic membrane, ear canal and external ear normal.      Left Ear: Tympanic membrane, ear canal and external ear normal.      Nose: Nose normal.      Mouth/Throat:      Mouth: Mucous membranes are moist.      Pharynx: Oropharynx is clear. Eyes:      General: No scleral icterus. Right eye: No discharge. Left eye: No discharge. Conjunctiva/sclera: Conjunctivae normal.   Cardiovascular:      Rate and Rhythm: Normal rate and regular rhythm. Heart sounds: Normal heart sounds. Pulmonary:      Effort: Pulmonary effort is normal. No respiratory distress. Breath sounds: Normal breath sounds. No wheezing. Abdominal:      General: There is no distension. Palpations: Abdomen is soft. Tenderness: There is abdominal tenderness (Lower abdominal and right upper quadrant tenderness). There is no guarding. Musculoskeletal:      Cervical back: Neck supple. Skin:     General: Skin is warm and dry. Findings: No rash. Neurological:      Mental Status: She is alert and oriented to person, place, and time.

## 2023-12-21 ENCOUNTER — HOSPITAL ENCOUNTER (OUTPATIENT)
Dept: PREADMISSION TESTING | Age: 23
Discharge: HOME | End: 2023-12-25
Attending: SURGERY | Admitting: SURGERY
Payer: COMMERCIAL

## 2023-12-21 VITALS
WEIGHT: 146 LBS | SYSTOLIC BLOOD PRESSURE: 117 MMHG | TEMPERATURE: 97.9 F | RESPIRATION RATE: 16 BRPM | HEART RATE: 90 BPM | OXYGEN SATURATION: 98 % | HEIGHT: 66 IN | BODY MASS INDEX: 23.46 KG/M2 | DIASTOLIC BLOOD PRESSURE: 60 MMHG

## 2023-12-21 LAB
ALBUMIN SERPL-MCNC: 4.2 G/DL (ref 3.5–5.2)
ALP SERPL-CCNC: 56 U/L (ref 35–104)
ALT SERPL-CCNC: 8 U/L (ref 5–33)
AMYLASE SERPL-CCNC: 53 U/L (ref 28–100)
ANION GAP SERPL CALCULATED.3IONS-SCNC: 9 MMOL/L (ref 9–17)
AST SERPL-CCNC: 11 U/L
BASOPHILS # BLD: 0 K/UL (ref 0–0.2)
BASOPHILS NFR BLD: 1 % (ref 0–2)
BILIRUB DIRECT SERPL-MCNC: 0.2 MG/DL
BILIRUB INDIRECT SERPL-MCNC: 0.7 MG/DL (ref 0–1)
BILIRUB SERPL-MCNC: 0.9 MG/DL (ref 0.3–1.2)
BUN SERPL-MCNC: 12 MG/DL (ref 6–20)
CALCIUM SERPL-MCNC: 8.7 MG/DL (ref 8.6–10.4)
CHLORIDE SERPL-SCNC: 104 MMOL/L (ref 98–107)
CO2 SERPL-SCNC: 28 MMOL/L (ref 20–31)
CREAT SERPL-MCNC: 0.5 MG/DL (ref 0.5–0.9)
EOSINOPHIL # BLD: 0.1 K/UL (ref 0–0.4)
EOSINOPHILS RELATIVE PERCENT: 1 % (ref 0–4)
ERYTHROCYTE [DISTWIDTH] IN BLOOD BY AUTOMATED COUNT: 12.7 % (ref 11.5–14.9)
GFR SERPL CREATININE-BSD FRML MDRD: >60 ML/MIN/1.73M2
GLUCOSE SERPL-MCNC: 78 MG/DL (ref 70–99)
HCT VFR BLD AUTO: 42.1 % (ref 36–46)
HGB BLD-MCNC: 14 G/DL (ref 12–16)
LIPASE SERPL-CCNC: 16 U/L (ref 13–60)
LYMPHOCYTES NFR BLD: 2.3 K/UL (ref 1–4.8)
LYMPHOCYTES RELATIVE PERCENT: 39 % (ref 24–44)
MCH RBC QN AUTO: 31.2 PG (ref 26–34)
MCHC RBC AUTO-ENTMCNC: 33.2 G/DL (ref 31–37)
MCV RBC AUTO: 94.1 FL (ref 80–100)
MONOCYTES NFR BLD: 0.4 K/UL (ref 0.1–1.3)
MONOCYTES NFR BLD: 7 % (ref 1–7)
NEUTROPHILS NFR BLD: 52 % (ref 36–66)
NEUTS SEG NFR BLD: 3.2 K/UL (ref 1.3–9.1)
PLATELET # BLD AUTO: 262 K/UL (ref 150–450)
PMV BLD AUTO: 8.4 FL (ref 6–12)
POTASSIUM SERPL-SCNC: 4 MMOL/L (ref 3.7–5.3)
PROT SERPL-MCNC: 6.8 G/DL (ref 6.4–8.3)
RBC # BLD AUTO: 4.48 M/UL (ref 4–5.2)
SODIUM SERPL-SCNC: 141 MMOL/L (ref 135–144)
WBC OTHER # BLD: 6 K/UL (ref 3.5–11)

## 2023-12-21 PROCEDURE — 82150 ASSAY OF AMYLASE: CPT

## 2023-12-21 PROCEDURE — 93005 ELECTROCARDIOGRAM TRACING: CPT

## 2023-12-21 PROCEDURE — 85025 COMPLETE CBC W/AUTO DIFF WBC: CPT

## 2023-12-21 PROCEDURE — 83690 ASSAY OF LIPASE: CPT

## 2023-12-21 PROCEDURE — 80076 HEPATIC FUNCTION PANEL: CPT

## 2023-12-21 PROCEDURE — 36415 COLL VENOUS BLD VENIPUNCTURE: CPT

## 2023-12-21 PROCEDURE — 80048 BASIC METABOLIC PNL TOTAL CA: CPT

## 2023-12-21 NOTE — DISCHARGE INSTRUCTIONS
Pre-op Instructions For Out-Patient Surgery    Medication Instructions:  Please stop herbs and any supplements now (includes vitamins and minerals). Please contact your surgeon and prescribing physician for pre-op instructions for any blood thinners. If you have inhalers/aerosol treatments at home, please use them the morning of your surgery and bring the inhalers with you to the hospital.    Please take the following medications the morning of your surgery with a sip of water:    None    Surgery Instructions:  After midnight before surgery:  Do not eat or drink anything, including water, mints, gum, and hard candy. You may brush your teeth without swallowing. No smoking, chewing tobacco, or street drugs. Please shower or bathe before surgery. If you were given Surgical Scrub Chlorhexidine Gluconate Liquid (CHG), please shower the night before and the morning of your surgery following the detailed instructions you received during your pre-admission visit. Please do not wear any cologne, lotion, powder, deodorant, jewelry, piercings, perfume, makeup, nail polish, hair accessories, or hair spray on the day of surgery. Wear loose comfortable clothing. Leave your valuables at home but bring a payment source for any after-surgery prescriptions you plan to fill at [facility]. Bring a storage case for any glasses/contacts. An adult who is responsible for you MUST drive you home and should be with you for the first 24 hours after surgery. If having out-patient knee and foot surgeries, please arrange for planned crutches, walker, or wheelchair before arriving to the hospital.    The Day of Surgery:  Arrive at [facility] Surgery Entrance at the time directed by your surgeon and check in at the desk. If you have a living will or healthcare power of , please bring a copy.     You will be taken to the pre-op holding area where you will be

## 2023-12-26 ENCOUNTER — ANESTHESIA EVENT (OUTPATIENT)
Dept: OPERATING ROOM | Age: 23
End: 2023-12-26
Payer: COMMERCIAL

## 2023-12-26 LAB
EKG ATRIAL RATE: 79 BPM
EKG P AXIS: 60 DEGREES
EKG P-R INTERVAL: 150 MS
EKG Q-T INTERVAL: 394 MS
EKG QRS DURATION: 88 MS
EKG QTC CALCULATION (BAZETT): 451 MS
EKG R AXIS: 75 DEGREES
EKG T AXIS: 73 DEGREES
EKG VENTRICULAR RATE: 79 BPM

## 2023-12-26 NOTE — PRE-PROCEDURE INSTRUCTIONS
No answer, left message ? Unable to leave message ? When were you told to arrive at hospital ?  1200    Do you have a  ? yes    Are you on any blood thinners ? no              If yes when did you stop taking ? Do you have your prep Rx filled and instruction ? Nothing to eat the day before , only clear liquids. Are you experiencing any covid symptoms ?   no  Do you have any infections or rash we should be aware of ?  none    Do you have the Hibiclens soap to use the night before and the morning of surgery ? yes  Nothing to eat or drink after midnight, only a sip of water to take any medication instructed to take the night before. Wear comfortable clothing, leave any valuables at home, remove any jewelry and body piercing .   advised

## 2023-12-27 ENCOUNTER — ANESTHESIA (OUTPATIENT)
Dept: OPERATING ROOM | Age: 23
End: 2023-12-27
Payer: COMMERCIAL

## 2023-12-27 ENCOUNTER — HOSPITAL ENCOUNTER (OUTPATIENT)
Age: 23
Setting detail: OUTPATIENT SURGERY
Discharge: HOME | End: 2023-12-27
Attending: SURGERY | Admitting: SURGERY
Payer: COMMERCIAL

## 2023-12-27 VITALS
BODY MASS INDEX: 23.46 KG/M2 | WEIGHT: 146 LBS | HEART RATE: 84 BPM | SYSTOLIC BLOOD PRESSURE: 127 MMHG | TEMPERATURE: 97.7 F | RESPIRATION RATE: 12 BRPM | DIASTOLIC BLOOD PRESSURE: 84 MMHG | HEIGHT: 66 IN | OXYGEN SATURATION: 98 %

## 2023-12-27 DIAGNOSIS — K81.1 CHRONIC CHOLECYSTITIS: Primary | ICD-10-CM

## 2023-12-27 PROCEDURE — 6360000002 HC RX W HCPCS

## 2023-12-27 PROCEDURE — 2580000003 HC RX 258: Performed by: ANESTHESIOLOGY

## 2023-12-27 PROCEDURE — 2500000003 HC RX 250 WO HCPCS

## 2023-12-27 PROCEDURE — S2900 ROBOTIC SURGICAL SYSTEM: HCPCS | Performed by: SURGERY

## 2023-12-27 PROCEDURE — 2709999900 HC NON-CHARGEABLE SUPPLY: Performed by: SURGERY

## 2023-12-27 PROCEDURE — 7100000001 HC PACU RECOVERY - ADDTL 15 MIN: Performed by: SURGERY

## 2023-12-27 PROCEDURE — 7100000011 HC PHASE II RECOVERY - ADDTL 15 MIN: Performed by: SURGERY

## 2023-12-27 PROCEDURE — 3700000001 HC ADD 15 MINUTES (ANESTHESIA): Performed by: SURGERY

## 2023-12-27 PROCEDURE — 7100000010 HC PHASE II RECOVERY - FIRST 15 MIN: Performed by: SURGERY

## 2023-12-27 PROCEDURE — 3600000019 HC SURGERY ROBOT ADDTL 15MIN: Performed by: SURGERY

## 2023-12-27 PROCEDURE — 3700000000 HC ANESTHESIA ATTENDED CARE: Performed by: SURGERY

## 2023-12-27 PROCEDURE — 3600000009 HC SURGERY ROBOT BASE: Performed by: SURGERY

## 2023-12-27 PROCEDURE — 6370000000 HC RX 637 (ALT 250 FOR IP): Performed by: ANESTHESIOLOGY

## 2023-12-27 PROCEDURE — 7100000031 HC ASPR PHASE II RECOVERY - ADDTL 15 MIN: Performed by: SURGERY

## 2023-12-27 PROCEDURE — 7100000030 HC ASPR PHASE II RECOVERY - FIRST 15 MIN: Performed by: SURGERY

## 2023-12-27 PROCEDURE — 2500000003 HC RX 250 WO HCPCS: Performed by: ANESTHESIOLOGY

## 2023-12-27 PROCEDURE — C1889 IMPLANT/INSERT DEVICE, NOC: HCPCS | Performed by: SURGERY

## 2023-12-27 PROCEDURE — 6360000002 HC RX W HCPCS: Performed by: SURGERY

## 2023-12-27 PROCEDURE — 88304 TISSUE EXAM BY PATHOLOGIST: CPT

## 2023-12-27 PROCEDURE — 6360000002 HC RX W HCPCS: Performed by: ANESTHESIOLOGY

## 2023-12-27 PROCEDURE — 7100000000 HC PACU RECOVERY - FIRST 15 MIN: Performed by: SURGERY

## 2023-12-27 PROCEDURE — 81025 URINE PREGNANCY TEST: CPT

## 2023-12-27 DEVICE — CLIP INT M L POLYMER LOK LIG HEM O LOK: Type: IMPLANTABLE DEVICE | Site: ABDOMEN | Status: FUNCTIONAL

## 2023-12-27 RX ORDER — SODIUM CHLORIDE 0.9 % (FLUSH) 0.9 %
5-40 SYRINGE (ML) INJECTION EVERY 12 HOURS SCHEDULED
Status: DISCONTINUED | OUTPATIENT
Start: 2023-12-27 | End: 2023-12-27 | Stop reason: HOSPADM

## 2023-12-27 RX ORDER — DEXAMETHASONE SODIUM PHOSPHATE 4 MG/ML
INJECTION, SOLUTION INTRA-ARTICULAR; INTRALESIONAL; INTRAMUSCULAR; INTRAVENOUS; SOFT TISSUE PRN
Status: DISCONTINUED | OUTPATIENT
Start: 2023-12-27 | End: 2023-12-27 | Stop reason: SDUPTHER

## 2023-12-27 RX ORDER — ONDANSETRON 2 MG/ML
INJECTION INTRAMUSCULAR; INTRAVENOUS PRN
Status: DISCONTINUED | OUTPATIENT
Start: 2023-12-27 | End: 2023-12-27 | Stop reason: SDUPTHER

## 2023-12-27 RX ORDER — DIPHENHYDRAMINE HYDROCHLORIDE 50 MG/ML
12.5 INJECTION INTRAMUSCULAR; INTRAVENOUS
Status: COMPLETED | OUTPATIENT
Start: 2023-12-27 | End: 2023-12-27

## 2023-12-27 RX ORDER — KETAMINE HYDROCHLORIDE 10 MG/ML
INJECTION, SOLUTION INTRAMUSCULAR; INTRAVENOUS PRN
Status: DISCONTINUED | OUTPATIENT
Start: 2023-12-27 | End: 2023-12-27 | Stop reason: SDUPTHER

## 2023-12-27 RX ORDER — SODIUM CHLORIDE 0.9 % (FLUSH) 0.9 %
5-40 SYRINGE (ML) INJECTION PRN
Status: DISCONTINUED | OUTPATIENT
Start: 2023-12-27 | End: 2023-12-27 | Stop reason: HOSPADM

## 2023-12-27 RX ORDER — ONDANSETRON 2 MG/ML
4 INJECTION INTRAMUSCULAR; INTRAVENOUS
Status: COMPLETED | OUTPATIENT
Start: 2023-12-27 | End: 2023-12-27

## 2023-12-27 RX ORDER — LIDOCAINE HYDROCHLORIDE 20 MG/ML
INJECTION, SOLUTION EPIDURAL; INFILTRATION; INTRACAUDAL; PERINEURAL PRN
Status: DISCONTINUED | OUTPATIENT
Start: 2023-12-27 | End: 2023-12-27 | Stop reason: SDUPTHER

## 2023-12-27 RX ORDER — ACETAMINOPHEN 500 MG
1000 TABLET ORAL ONCE
Status: COMPLETED | OUTPATIENT
Start: 2023-12-27 | End: 2023-12-27

## 2023-12-27 RX ORDER — CEPHALEXIN 500 MG/1
CAPSULE ORAL
Qty: 21 CAPSULE | Refills: 0 | Status: SHIPPED | OUTPATIENT
Start: 2023-12-27

## 2023-12-27 RX ORDER — FENTANYL CITRATE 50 UG/ML
INJECTION, SOLUTION INTRAMUSCULAR; INTRAVENOUS PRN
Status: DISCONTINUED | OUTPATIENT
Start: 2023-12-27 | End: 2023-12-27 | Stop reason: SDUPTHER

## 2023-12-27 RX ORDER — ROCURONIUM BROMIDE 10 MG/ML
INJECTION, SOLUTION INTRAVENOUS PRN
Status: DISCONTINUED | OUTPATIENT
Start: 2023-12-27 | End: 2023-12-27 | Stop reason: SDUPTHER

## 2023-12-27 RX ORDER — BUPIVACAINE HYDROCHLORIDE 5 MG/ML
INJECTION, SOLUTION EPIDURAL; INTRACAUDAL PRN
Status: DISCONTINUED | OUTPATIENT
Start: 2023-12-27 | End: 2023-12-27 | Stop reason: ALTCHOICE

## 2023-12-27 RX ORDER — ONDANSETRON 4 MG/1
TABLET, FILM COATED ORAL
Qty: 20 TABLET | Refills: 0 | Status: SHIPPED | OUTPATIENT
Start: 2023-12-27

## 2023-12-27 RX ORDER — GABAPENTIN 300 MG/1
300 CAPSULE ORAL ONCE
Status: COMPLETED | OUTPATIENT
Start: 2023-12-27 | End: 2023-12-27

## 2023-12-27 RX ORDER — MIDAZOLAM HYDROCHLORIDE 1 MG/ML
INJECTION INTRAMUSCULAR; INTRAVENOUS PRN
Status: DISCONTINUED | OUTPATIENT
Start: 2023-12-27 | End: 2023-12-27 | Stop reason: SDUPTHER

## 2023-12-27 RX ORDER — SODIUM CHLORIDE 9 MG/ML
INJECTION, SOLUTION INTRAVENOUS PRN
Status: DISCONTINUED | OUTPATIENT
Start: 2023-12-27 | End: 2023-12-27 | Stop reason: HOSPADM

## 2023-12-27 RX ORDER — GLYCOPYRROLATE 0.2 MG/ML
INJECTION INTRAMUSCULAR; INTRAVENOUS PRN
Status: DISCONTINUED | OUTPATIENT
Start: 2023-12-27 | End: 2023-12-27 | Stop reason: SDUPTHER

## 2023-12-27 RX ORDER — OXYCODONE HYDROCHLORIDE AND ACETAMINOPHEN 5; 325 MG/1; MG/1
1 TABLET ORAL
Status: COMPLETED | OUTPATIENT
Start: 2023-12-27 | End: 2023-12-27

## 2023-12-27 RX ORDER — OXYCODONE HYDROCHLORIDE AND ACETAMINOPHEN 5; 325 MG/1; MG/1
1 TABLET ORAL EVERY 6 HOURS PRN
Qty: 28 TABLET | Refills: 0 | Status: SHIPPED | OUTPATIENT
Start: 2023-12-27 | End: 2024-01-03

## 2023-12-27 RX ORDER — PROPOFOL 10 MG/ML
INJECTION, EMULSION INTRAVENOUS PRN
Status: DISCONTINUED | OUTPATIENT
Start: 2023-12-27 | End: 2023-12-27 | Stop reason: SDUPTHER

## 2023-12-27 RX ORDER — SODIUM CHLORIDE, SODIUM LACTATE, POTASSIUM CHLORIDE, CALCIUM CHLORIDE 600; 310; 30; 20 MG/100ML; MG/100ML; MG/100ML; MG/100ML
INJECTION, SOLUTION INTRAVENOUS CONTINUOUS
Status: DISCONTINUED | OUTPATIENT
Start: 2023-12-27 | End: 2023-12-27 | Stop reason: HOSPADM

## 2023-12-27 RX ORDER — KETOROLAC TROMETHAMINE 30 MG/ML
INJECTION, SOLUTION INTRAMUSCULAR; INTRAVENOUS PRN
Status: DISCONTINUED | OUTPATIENT
Start: 2023-12-27 | End: 2023-12-27 | Stop reason: SDUPTHER

## 2023-12-27 RX ORDER — LIDOCAINE HYDROCHLORIDE 10 MG/ML
1 INJECTION, SOLUTION EPIDURAL; INFILTRATION; INTRACAUDAL; PERINEURAL
Status: COMPLETED | OUTPATIENT
Start: 2023-12-27 | End: 2023-12-27

## 2023-12-27 RX ADMIN — Medication 2000 MG: at 14:48

## 2023-12-27 RX ADMIN — FENTANYL CITRATE 50 MCG: 50 INJECTION, SOLUTION INTRAMUSCULAR; INTRAVENOUS at 14:39

## 2023-12-27 RX ADMIN — PROPOFOL 150 MG: 10 INJECTION, EMULSION INTRAVENOUS at 14:39

## 2023-12-27 RX ADMIN — SODIUM CHLORIDE, POTASSIUM CHLORIDE, SODIUM LACTATE AND CALCIUM CHLORIDE: 600; 310; 30; 20 INJECTION, SOLUTION INTRAVENOUS at 15:10

## 2023-12-27 RX ADMIN — SUGAMMADEX 200 MG: 100 INJECTION, SOLUTION INTRAVENOUS at 15:46

## 2023-12-27 RX ADMIN — GLYCOPYRROLATE 0.1 MG: 0.2 INJECTION INTRAMUSCULAR; INTRAVENOUS at 14:33

## 2023-12-27 RX ADMIN — SODIUM CHLORIDE, POTASSIUM CHLORIDE, SODIUM LACTATE AND CALCIUM CHLORIDE: 600; 310; 30; 20 INJECTION, SOLUTION INTRAVENOUS at 13:12

## 2023-12-27 RX ADMIN — ROCURONIUM BROMIDE 50 MG: 10 INJECTION, SOLUTION INTRAVENOUS at 14:39

## 2023-12-27 RX ADMIN — LIDOCAINE HYDROCHLORIDE 1 ML: 10 INJECTION, SOLUTION EPIDURAL; INFILTRATION; INTRACAUDAL; PERINEURAL at 13:12

## 2023-12-27 RX ADMIN — OXYCODONE AND ACETAMINOPHEN 1 TABLET: 5; 325 TABLET ORAL at 17:03

## 2023-12-27 RX ADMIN — ONDANSETRON 4 MG: 2 INJECTION INTRAMUSCULAR; INTRAVENOUS at 14:47

## 2023-12-27 RX ADMIN — KETOROLAC TROMETHAMINE 15 MG: 30 INJECTION, SOLUTION INTRAMUSCULAR; INTRAVENOUS at 15:36

## 2023-12-27 RX ADMIN — ONDANSETRON 4 MG: 2 INJECTION INTRAMUSCULAR; INTRAVENOUS at 16:26

## 2023-12-27 RX ADMIN — FENTANYL CITRATE 50 MCG: 50 INJECTION, SOLUTION INTRAMUSCULAR; INTRAVENOUS at 14:53

## 2023-12-27 RX ADMIN — ACETAMINOPHEN 1000 MG: 500 TABLET ORAL at 13:13

## 2023-12-27 RX ADMIN — GABAPENTIN 300 MG: 300 CAPSULE ORAL at 13:13

## 2023-12-27 RX ADMIN — HYDROMORPHONE HYDROCHLORIDE 0.5 MG: 1 INJECTION, SOLUTION INTRAMUSCULAR; INTRAVENOUS; SUBCUTANEOUS at 16:33

## 2023-12-27 RX ADMIN — FENTANYL CITRATE 50 MCG: 50 INJECTION, SOLUTION INTRAMUSCULAR; INTRAVENOUS at 15:30

## 2023-12-27 RX ADMIN — LIDOCAINE HYDROCHLORIDE 100 MG: 20 INJECTION, SOLUTION EPIDURAL; INFILTRATION; INTRACAUDAL; PERINEURAL at 14:39

## 2023-12-27 RX ADMIN — KETAMINE HYDROCHLORIDE 30 MG: 10 INJECTION, SOLUTION INTRAMUSCULAR; INTRAVENOUS at 14:53

## 2023-12-27 RX ADMIN — DIPHENHYDRAMINE HYDROCHLORIDE 12.5 MG: 50 INJECTION INTRAMUSCULAR; INTRAVENOUS at 16:37

## 2023-12-27 RX ADMIN — HYDROMORPHONE HYDROCHLORIDE 0.5 MG: 1 INJECTION, SOLUTION INTRAMUSCULAR; INTRAVENOUS; SUBCUTANEOUS at 16:26

## 2023-12-27 RX ADMIN — MIDAZOLAM 2 MG: 1 INJECTION INTRAMUSCULAR; INTRAVENOUS at 14:33

## 2023-12-27 RX ADMIN — DEXAMETHASONE SODIUM PHOSPHATE 8 MG: 4 INJECTION INTRA-ARTICULAR; INTRALESIONAL; INTRAMUSCULAR; INTRAVENOUS; SOFT TISSUE at 14:47

## 2023-12-27 RX ADMIN — ROCURONIUM BROMIDE 10 MG: 10 INJECTION, SOLUTION INTRAVENOUS at 15:27

## 2023-12-27 ASSESSMENT — PAIN - FUNCTIONAL ASSESSMENT
PAIN_FUNCTIONAL_ASSESSMENT: 0-10
PAIN_FUNCTIONAL_ASSESSMENT: 0-10
PAIN_FUNCTIONAL_ASSESSMENT: ADULT NONVERBAL PAIN SCALE (NPVS)
PAIN_FUNCTIONAL_ASSESSMENT: 0-10

## 2023-12-27 ASSESSMENT — PAIN SCALES - GENERAL
PAINLEVEL_OUTOF10: 0
PAINLEVEL_OUTOF10: 7
PAINLEVEL_OUTOF10: 5
PAINLEVEL_OUTOF10: 6
PAINLEVEL_OUTOF10: 10

## 2023-12-27 ASSESSMENT — PAIN DESCRIPTION - DESCRIPTORS
DESCRIPTORS: SHARP
DESCRIPTORS: SORE

## 2023-12-27 ASSESSMENT — PAIN DESCRIPTION - LOCATION: LOCATION: ABDOMEN

## 2023-12-27 ASSESSMENT — PAIN DESCRIPTION - ONSET: ONSET: AWAKENED FROM SLEEP

## 2023-12-27 ASSESSMENT — PAIN DESCRIPTION - PAIN TYPE: TYPE: SURGICAL PAIN

## 2023-12-27 ASSESSMENT — PAIN DESCRIPTION - ORIENTATION: ORIENTATION: MID

## 2023-12-27 NOTE — H&P
HISTORY and [address]       NAME:  Ruddy Harmon  MRN: [id number]   YOB: 2000   Date: 12/27/2023   Age: 21 y.o. Gender: female       COMPLAINT AND PRESENT HISTORY:     Ruddy Harmon is 21 y.o.,  female, presents for CHOLECYSTECTOMY LAPAROSCOPIC ROBOTIC XI  POSSIBLE  OPEN   Primary dx: Chronic cholecystitis [K81.1]. HPI:  Ruddy Harmon is 21 y.o.,   female, undergoing  for Robotic Laparoscopic Cholecystectomy  Pt complains of abdominal pain in the RLQ and around her bellybutton with N/V. She describe her pain as sharp and stabbing in character, pain increases with eating greasy fat food meals, pain did not radiates to the Rt back or to the shoulder. Symptoms started 11/15/23 and she came to the ER. She had CT abdomen pelvis scan  and US gallbladder done which showed  cholecystitis   No diarrhea or constipation. No change in the color of the stools. No fever or chills. Pt denies fever/chills, chest pain or SOB    CT ABDOMEN PELVIS W IV CONTRAST Additional Contrast? None [[id number]   FINDINGS:  Lower Chest: The lung bases are clear and the heart size is normal.     Organs: There are a few small calcified gallstones. There is mild  gallbladder wall thickening and trace pericholecystic fluid. There is  periportal edema. The liver is otherwise unremarkable. The spleen,  pancreas, adrenal glands and kidneys are normal.     GI/Bowel: The appendix is normal (coronal image 33-36). Unopacified bowel  loops are unremarkable. No bowel obstruction. Pelvis: Uterus and ovaries are unremarkable. Urinary bladder was not well  distended. Mild diffuse urinary bladder wall thickening versus  underdistention. Peritoneum/Retroperitoneum: There is no adenopathy, free air or free fluid. Bones/Soft Tissues: No acute bone or soft tissue abnormality. IMPRESSION:  Cholelithiasis. Mild gallbladder wall thickening and probable trace  pericholecystic fluid.   Correlate clinically

## 2023-12-27 NOTE — OP NOTE
Patient: Yaakov Lucero  YOB: 2000  MRN: [id number]    Date of Procedure: 12/27/2023  Preoperative diagnosis: Chronic cholecystitis    Postoperative diagnosis: Same    Procedure: Robotic cholecystectomy    Surgeon: Dr. Raghavendra Leung    Asst.: None    Anesthesia: General    Preparation: Chloraprep    EBL: Less than 10 mL    Specimen: Gallbladder    Procedure: Informed consent was obtained. Preoperative antibiotic was given. Patient was taken to the operating room. General anesthesia was given. Abdomen was prepped and draped in usual sterile fashion. Timeout was done. Subumbilical incision was made. Si Belt port was introduced using the open technique without any difficulty. CO2 insufflation was carried out. Scope was introduced. Patient was placed in a reverse Trendelenburg position. 3 additional ports were placed in usual fashion. Robot was brought in. All the ports were docked in usual fashion. Camera and the ancillary instruments were advanced towards the target anatomy. Assistant grabbed the fundus of the gallbladder and that was retracted anterosuperiorly. Careful dissection was continued in the triangle of calot. Cystic duct was isolated was skeletonized. Cystic artery was isolated was skeletonized. Critical view was obtained. Anatomy was confirmed. After confirming the anatomy cystic duct was clipped and divided. Cystic artery was clipped and divided. Gallbladder was peeled off the liver bed using the hook cautery. Complete hemostasis was confirmed. All the clips were found to be intact. At this point instruments were removed and the robot was undocked. Gallbladder was retrieved and sent to pathology for further evaluation. All the port sites are visualized. No bleeding noted. All the ports were removed. Fascia and the skin was approximated in the usual fashion. Local anesthetic was infiltrated. Dermabond was applied. Steri-Strips applied. Sterile dressing was applied.

## 2023-12-28 NOTE — PROGRESS NOTES
CLINICAL PHARMACY NOTE: MEDS TO BEDS    Total # of Prescriptions Filled: 3   The following medications were delivered to the patient:  Oxycodone/APAP 5/325mg  Ondansetron 4mg  Cephalexin 500mg     Additional Documentation: p/u by family at [facility] 12/27/23 4:58pm darell

## 2024-01-02 LAB — SURGICAL PATHOLOGY REPORT: NORMAL

## 2024-01-15 ENCOUNTER — OFFICE VISIT (OUTPATIENT)
Dept: OBGYN CLINIC | Age: 24
End: 2024-01-15
Payer: COMMERCIAL

## 2024-01-15 VITALS
DIASTOLIC BLOOD PRESSURE: 82 MMHG | BODY MASS INDEX: 22.82 KG/M2 | SYSTOLIC BLOOD PRESSURE: 100 MMHG | HEIGHT: 66 IN | WEIGHT: 142 LBS

## 2024-01-15 DIAGNOSIS — Z01.419 WELL FEMALE EXAM WITH ROUTINE GYNECOLOGICAL EXAM: Primary | ICD-10-CM

## 2024-01-15 PROCEDURE — 99395 PREV VISIT EST AGE 18-39: CPT | Performed by: NURSE PRACTITIONER

## 2024-01-15 RX ORDER — ACETAMINOPHEN AND CODEINE PHOSPHATE 120; 12 MG/5ML; MG/5ML
1 SOLUTION ORAL DAILY
Qty: 28 TABLET | Refills: 12 | Status: SHIPPED | OUTPATIENT
Start: 2024-01-15 | End: 2025-01-14

## 2024-01-15 NOTE — PROGRESS NOTES
Not on file   Housing Stability: Unknown (11/20/2023)    Housing Stability Vital Sign     Unable to Pay for Housing in the Last Year: Not on file     Number of Places Lived in the Last Year: Not on file     Unstable Housing in the Last Year: No       MEDICATIONS:  Current Outpatient Medications   Medication Sig Dispense Refill    norethindrone (ORTHO MICRONOR) 0.35 MG tablet Take 1 tablet by mouth daily 28 tablet 12     No current facility-administered medications for this visit.           ALLERGIES:  Allergies as of 01/15/2024    (No Known Allergies)       Symptoms of decreased mood absent  Symptoms of anhedonia absent    **If either question is answered in a  positive fashion then complete the PHQ9 Scoring Evaluation and make the appropriate referral**      Immunization status: up to date and documented.      Gynecologic History:  Menarche: 11 yo  Menopause at [age] yo     Patient's last menstrual period was 01/05/2024 (exact date).    Sexually Active: Yes    STD History: No     Permanent Sterilization: No   Reversible Birth Control: No        Hormone Replacement Exposure: No      Genetic Qualified Family History of Breast, Ovarian , Colon or Uterine Cancer: Yes MOM recently dx'ed with breast cancer- genetics negative       If YES see scanned worksheet.    Preventative Health Testing:    Health Maintenance:  Health Maintenance Due   Topic Date Due    COVID-19 Vaccine (1) Never done    Pneumococcal 0-64 years Vaccine (1 - PCV) 06/26/2006    Hepatitis C screen  Never done    Chlamydia/GC screen  10/19/2018    Pap smear  Never done    Flu vaccine (1) 08/01/2023       PAP Smear History:  No results found for: \"CYTORPT\"]  Abnormal Pap Smear History: denies  Colposcopy History:   Date of Last Mammogram: na  Date of Last Colonoscopy:   Date of Last Bone Density:      ________________________________________________________________________        REVIEW OF SYSTEMS:    see problem list    A minimum of an eleven point review

## 2024-04-02 ENCOUNTER — OFFICE VISIT (OUTPATIENT)
Dept: FAMILY MEDICINE CLINIC | Age: 24
End: 2024-04-02
Payer: COMMERCIAL

## 2024-04-02 VITALS
SYSTOLIC BLOOD PRESSURE: 90 MMHG | WEIGHT: 135.4 LBS | BODY MASS INDEX: 21.85 KG/M2 | DIASTOLIC BLOOD PRESSURE: 56 MMHG | RESPIRATION RATE: 14 BRPM | OXYGEN SATURATION: 100 % | HEART RATE: 77 BPM

## 2024-04-02 DIAGNOSIS — K21.9 GASTROESOPHAGEAL REFLUX DISEASE, UNSPECIFIED WHETHER ESOPHAGITIS PRESENT: Primary | ICD-10-CM

## 2024-04-02 DIAGNOSIS — Z90.49 S/P CHOLECYSTECTOMY: ICD-10-CM

## 2024-04-02 PROCEDURE — 99213 OFFICE O/P EST LOW 20 MIN: CPT | Performed by: FAMILY MEDICINE

## 2024-04-02 RX ORDER — FAMOTIDINE 20 MG/1
20 TABLET, FILM COATED ORAL 2 TIMES DAILY
Qty: 60 TABLET | Refills: 3 | Status: SHIPPED | OUTPATIENT
Start: 2024-04-02

## 2024-04-02 SDOH — ECONOMIC STABILITY: FOOD INSECURITY: WITHIN THE PAST 12 MONTHS, YOU WORRIED THAT YOUR FOOD WOULD RUN OUT BEFORE YOU GOT MONEY TO BUY MORE.: NEVER TRUE

## 2024-04-02 SDOH — ECONOMIC STABILITY: FOOD INSECURITY: WITHIN THE PAST 12 MONTHS, THE FOOD YOU BOUGHT JUST DIDN'T LAST AND YOU DIDN'T HAVE MONEY TO GET MORE.: NEVER TRUE

## 2024-04-02 SDOH — ECONOMIC STABILITY: INCOME INSECURITY: HOW HARD IS IT FOR YOU TO PAY FOR THE VERY BASICS LIKE FOOD, HOUSING, MEDICAL CARE, AND HEATING?: NOT HARD AT ALL

## 2024-04-02 ASSESSMENT — PATIENT HEALTH QUESTIONNAIRE - PHQ9
SUM OF ALL RESPONSES TO PHQ9 QUESTIONS 1 & 2: 0
1. LITTLE INTEREST OR PLEASURE IN DOING THINGS: NOT AT ALL
SUM OF ALL RESPONSES TO PHQ QUESTIONS 1-9: 0
2. FEELING DOWN, DEPRESSED OR HOPELESS: NOT AT ALL
SUM OF ALL RESPONSES TO PHQ QUESTIONS 1-9: 0

## 2024-04-02 ASSESSMENT — ENCOUNTER SYMPTOMS
SHORTNESS OF BREATH: 0
ABDOMINAL PAIN: 0
BLOOD IN STOOL: 0

## 2024-04-02 NOTE — PROGRESS NOTES
Subjective:      Patient ID: Brandi Werner is a 23 y.o. female.    HPI  Visit Information    Have you changed or started any medications since your last visit including any over-the-counter medicines, vitamins, or herbal medicines? yes -    Are you having any side effects from any of your medications? -  no  Have you stopped taking any of your medications? Is so, why? -  no    Have you seen any other physician or provider since your last visit? Yes - Records Obtained  Have you had any other diagnostic tests since your last visit? Yes - Records Obtained  Have you been seen in the emergency room and/or had an admission to a hospital since we last saw you? Yes - Records Obtained  Have you had your routine dental cleaning in the past 6 months? yes -     Have you activated your [employer] account? If not, what are your barriers? Yes     Patient Care Team:  Bharat Grant MD as PCP - General (Family Medicine)  Bharat Grant MD as PCP - Empaneled Provider  Amari Nazario MD as Obstetrician (Perinatology)    Medical History Review  Past Medical, Family, and Social History reviewed and does contribute to the patient presenting condition    Health Maintenance   Topic Date Due    COVID-19 Vaccine (1) Never done    Pneumococcal 0-64 years Vaccine (1 of 2 - PCV) 06/26/2006    Hepatitis C screen  Never done    Chlamydia/GC screen  10/19/2018    Pap smear  Never done    Flu vaccine (Season Ended) 08/01/2024    Depression Screen  11/20/2024    DTaP/Tdap/Td vaccine (8 - Td or Tdap) 04/27/2032    Hepatitis A vaccine  Completed    Hepatitis B vaccine  Completed    HPV vaccine  Completed    Polio vaccine  Completed    Varicella vaccine  Completed    Meningococcal (ACWY) vaccine  Completed    HIV screen  Completed    Hib vaccine  Aged Out   Patient is a 23-year-old female who presents with complaint of acid reflux/heartburn for several months.  She states that she had a cholecystectomy for cholecystitis in December of last

## 2025-09-02 ENCOUNTER — TELEPHONE (OUTPATIENT)
Dept: OBGYN CLINIC | Age: 25
End: 2025-09-02

## 2025-09-02 DIAGNOSIS — N92.6 MISSED MENSES: Primary | ICD-10-CM

## 2025-09-04 ENCOUNTER — CLINICAL SUPPORT (OUTPATIENT)
Dept: OBGYN CLINIC | Age: 25
End: 2025-09-04
Payer: COMMERCIAL

## 2025-09-04 ENCOUNTER — HOSPITAL ENCOUNTER (OUTPATIENT)
Age: 25
Setting detail: SPECIMEN
Discharge: HOME | End: 2025-09-04

## 2025-09-04 DIAGNOSIS — N92.6 MISSED MENSES: ICD-10-CM

## 2025-09-04 DIAGNOSIS — N92.6 MISSED MENSES: Primary | ICD-10-CM

## 2025-09-04 LAB — B-HCG SERPL EIA 3RD IS-ACNC: NORMAL MIU/ML

## 2025-09-04 PROCEDURE — 36415 COLL VENOUS BLD VENIPUNCTURE: CPT | Performed by: NURSE PRACTITIONER

## 2025-09-04 PROCEDURE — 99999 PR OFFICE/OUTPT VISIT,PROCEDURE ONLY: CPT | Performed by: NURSE PRACTITIONER

## 2025-09-05 ENCOUNTER — RESULTS FOLLOW-UP (OUTPATIENT)
Dept: OBGYN CLINIC | Age: 25
End: 2025-09-05

## 2025-09-05 DIAGNOSIS — Z34.90 EARLY STAGE OF PREGNANCY: Primary | ICD-10-CM

## (undated) DEVICE — AIRSEAL 5 MM ACCESS PORT AND LOW PROFILE OBTURATOR WITH BLADELESS OPTICAL TIP, 120 MM LENGTH: Brand: AIRSEAL

## (undated) DEVICE — COVER,MAYO STAND,STERILE: Brand: MEDLINE

## (undated) DEVICE — ST CHARLES GEN LAPAROSCOPY: Brand: MEDLINE INDUSTRIES, INC.

## (undated) DEVICE — SOLUTION IRRIG 1000ML STRL H2O USP PLAS POUR BTL

## (undated) DEVICE — GLOVE ORTHO 7 1/2   MSG9475

## (undated) DEVICE — BLANKET WRM W29.9XL79.1IN UP BODY FORC AIR MISTRAL-AIR

## (undated) DEVICE — TROCARS: Brand: KII® BALLOON BLUNT TIP SYSTEM

## (undated) DEVICE — BLADELESS OBTURATOR: Brand: WECK VISTA

## (undated) DEVICE — SUTURE MCRYL + SZ 4-0 L27IN ABSRB UD L19MM PS-2 3/8 CIR MCP426H

## (undated) DEVICE — TROCAR: Brand: KII FIOS FIRST ENTRY

## (undated) DEVICE — SOLUTION IRRIG 1000ML 0.9% SOD CHL USP POUR PLAS BTL

## (undated) DEVICE — ARM DRAPE

## (undated) DEVICE — LIQUIBAND RAPID ADHESIVE 36/CS 0.8ML: Brand: MEDLINE

## (undated) DEVICE — SYRINGE MED 20ML STD CLR PLAS LUERSLIP TIP N CTRL DISP

## (undated) DEVICE — TRI-LUMEN FILTERED TUBE SET WITH ACTIVATED CHARCOAL FILTER: Brand: AIRSEAL

## (undated) DEVICE — SUTURE PDS II SZ 0 L27IN ABSRB VLT UR-6 L26MM 1/2 CIR D7185

## (undated) DEVICE — CANNULA SEAL